# Patient Record
Sex: FEMALE | Race: WHITE | NOT HISPANIC OR LATINO | Employment: OTHER | URBAN - METROPOLITAN AREA
[De-identification: names, ages, dates, MRNs, and addresses within clinical notes are randomized per-mention and may not be internally consistent; named-entity substitution may affect disease eponyms.]

---

## 2021-06-10 ENCOUNTER — HOSPITAL ENCOUNTER (INPATIENT)
Facility: CLINIC | Age: 76
Setting detail: SURGERY ADMIT
End: 2021-06-10
Attending: COLON & RECTAL SURGERY | Admitting: COLON & RECTAL SURGERY
Payer: MEDICARE

## 2021-06-10 DIAGNOSIS — Z11.59 ENCOUNTER FOR SCREENING FOR OTHER VIRAL DISEASES: ICD-10-CM

## 2021-06-29 ENCOUNTER — HOSPITAL ENCOUNTER (OUTPATIENT)
Dept: CT IMAGING | Facility: CLINIC | Age: 76
Discharge: HOME | End: 2021-06-29
Attending: COLON & RECTAL SURGERY | Admitting: COLON & RECTAL SURGERY
Payer: MEDICARE

## 2021-06-29 DIAGNOSIS — K63.5 CECAL POLYP: ICD-10-CM

## 2021-06-29 LAB
CREAT BLD-MCNC: 0.8 MG/DL (ref 0.52–1.04)
GFR SERPL CREATININE-BSD FRML MDRD: 70 ML/MIN/{1.73_M2}

## 2021-06-29 PROCEDURE — 82565 ASSAY OF CREATININE: CPT

## 2021-06-29 PROCEDURE — 250N000009 HC RX 250: Performed by: COLON & RECTAL SURGERY

## 2021-06-29 PROCEDURE — 74177 CT ABD & PELVIS W/CONTRAST: CPT

## 2021-06-29 PROCEDURE — 250N000011 HC RX IP 250 OP 636: Performed by: COLON & RECTAL SURGERY

## 2021-06-29 RX ORDER — IOPAMIDOL 755 MG/ML
68 INJECTION, SOLUTION INTRAVASCULAR ONCE
Status: COMPLETED | OUTPATIENT
Start: 2021-06-29 | End: 2021-06-29

## 2021-06-29 RX ADMIN — SODIUM CHLORIDE 59 ML: 9 INJECTION, SOLUTION INTRAVENOUS at 14:00

## 2021-06-29 RX ADMIN — IOPAMIDOL 68 ML: 755 INJECTION, SOLUTION INTRAVENOUS at 14:00

## 2021-07-10 DIAGNOSIS — Z11.59 ENCOUNTER FOR SCREENING FOR OTHER VIRAL DISEASES: ICD-10-CM

## 2021-07-10 LAB
SARS-COV-2 RNA RESP QL NAA+PROBE: NORMAL
SPECIMEN SOURCE: NORMAL

## 2021-07-10 PROCEDURE — U0005 INFEC AGEN DETEC AMPLI PROBE: HCPCS | Performed by: COLON & RECTAL SURGERY

## 2021-07-10 PROCEDURE — U0003 INFECTIOUS AGENT DETECTION BY NUCLEIC ACID (DNA OR RNA); SEVERE ACUTE RESPIRATORY SYNDROME CORONAVIRUS 2 (SARS-COV-2) (CORONAVIRUS DISEASE [COVID-19]), AMPLIFIED PROBE TECHNIQUE, MAKING USE OF HIGH THROUGHPUT TECHNOLOGIES AS DESCRIBED BY CMS-2020-01-R: HCPCS | Performed by: COLON & RECTAL SURGERY

## 2021-07-11 LAB
LABORATORY COMMENT REPORT: NORMAL
SARS-COV-2 RNA RESP QL NAA+PROBE: NEGATIVE
SPECIMEN SOURCE: NORMAL

## 2021-07-13 RX ORDER — LATANOPROST 50 UG/ML
1 SOLUTION/ DROPS OPHTHALMIC AT BEDTIME
COMMUNITY

## 2021-07-13 RX ORDER — DORZOLAMIDE HCL 20 MG/ML
1 SOLUTION/ DROPS OPHTHALMIC 2 TIMES DAILY
COMMUNITY
Start: 2021-07-08

## 2021-07-13 RX ORDER — ALBUTEROL SULFATE 90 UG/1
1-2 AEROSOL, METERED RESPIRATORY (INHALATION) EVERY 6 HOURS PRN
COMMUNITY
Start: 2021-07-08

## 2021-07-13 RX ORDER — NEOMYCIN SULFATE 500 MG/1
500 TABLET ORAL SEE ADMIN INSTRUCTIONS
Status: ON HOLD | COMMUNITY
Start: 2021-06-12 | End: 2022-01-14

## 2021-07-13 RX ORDER — EPINEPHRINE 0.3 MG/.3ML
0.3 INJECTION SUBCUTANEOUS
COMMUNITY
Start: 2021-07-08 | End: 2022-01-11

## 2021-07-13 RX ORDER — ALLOPURINOL 300 MG/1
300 TABLET ORAL EVERY EVENING
COMMUNITY
Start: 2019-09-13

## 2021-07-13 RX ORDER — METRONIDAZOLE 500 MG/1
500 TABLET ORAL SEE ADMIN INSTRUCTIONS
Status: ON HOLD | COMMUNITY
Start: 2021-06-12 | End: 2022-01-14

## 2021-07-13 RX ORDER — ONDANSETRON 4 MG/1
4 TABLET, ORALLY DISINTEGRATING ORAL EVERY 6 HOURS PRN
COMMUNITY
Start: 2021-06-12

## 2021-12-16 DIAGNOSIS — Z11.59 ENCOUNTER FOR SCREENING FOR OTHER VIRAL DISEASES: ICD-10-CM

## 2022-01-11 RX ORDER — ASPIRIN 81 MG/1
81 TABLET, CHEWABLE ORAL DAILY
COMMUNITY

## 2022-01-11 RX ORDER — LETROZOLE 2.5 MG/1
2.5 TABLET, FILM COATED ORAL EVERY EVENING
COMMUNITY

## 2022-01-11 RX ORDER — ACETAMINOPHEN 500 MG
500-1000 TABLET ORAL EVERY 6 HOURS PRN
COMMUNITY

## 2022-01-11 RX ORDER — CLOPIDOGREL BISULFATE 75 MG/1
75 TABLET ORAL DAILY
Status: ON HOLD | COMMUNITY
End: 2022-01-12

## 2022-01-12 ENCOUNTER — ANESTHESIA (OUTPATIENT)
Dept: SURGERY | Facility: CLINIC | Age: 77
DRG: 330 | End: 2022-01-12
Payer: MEDICARE

## 2022-01-12 ENCOUNTER — HOSPITAL ENCOUNTER (INPATIENT)
Facility: CLINIC | Age: 77
LOS: 3 days | Discharge: HOME | DRG: 330 | End: 2022-01-15
Attending: COLON & RECTAL SURGERY | Admitting: COLON & RECTAL SURGERY
Payer: MEDICARE

## 2022-01-12 ENCOUNTER — ANESTHESIA EVENT (OUTPATIENT)
Dept: SURGERY | Facility: CLINIC | Age: 77
DRG: 330 | End: 2022-01-12
Payer: MEDICARE

## 2022-01-12 DIAGNOSIS — K63.5 CECAL POLYP: Primary | ICD-10-CM

## 2022-01-12 PROCEDURE — 0DTF0ZZ RESECTION OF RIGHT LARGE INTESTINE, OPEN APPROACH: ICD-10-PCS | Performed by: COLON & RECTAL SURGERY

## 2022-01-12 PROCEDURE — 250N000011 HC RX IP 250 OP 636: Performed by: ANESTHESIOLOGY

## 2022-01-12 PROCEDURE — 999N000141 HC STATISTIC PRE-PROCEDURE NURSING ASSESSMENT: Performed by: COLON & RECTAL SURGERY

## 2022-01-12 PROCEDURE — 258N000003 HC RX IP 258 OP 636: Performed by: NURSE ANESTHETIST, CERTIFIED REGISTERED

## 2022-01-12 PROCEDURE — 250N000025 HC SEVOFLURANE, PER MIN: Performed by: COLON & RECTAL SURGERY

## 2022-01-12 PROCEDURE — 250N000011 HC RX IP 250 OP 636: Performed by: COLON & RECTAL SURGERY

## 2022-01-12 PROCEDURE — 250N000011 HC RX IP 250 OP 636: Performed by: NURSE ANESTHETIST, CERTIFIED REGISTERED

## 2022-01-12 PROCEDURE — 250N000009 HC RX 250: Performed by: NURSE ANESTHETIST, CERTIFIED REGISTERED

## 2022-01-12 PROCEDURE — 88309 TISSUE EXAM BY PATHOLOGIST: CPT | Mod: TC | Performed by: COLON & RECTAL SURGERY

## 2022-01-12 PROCEDURE — 258N000001 HC RX 258: Performed by: COLON & RECTAL SURGERY

## 2022-01-12 PROCEDURE — 250N000009 HC RX 250: Performed by: COLON & RECTAL SURGERY

## 2022-01-12 PROCEDURE — P9041 ALBUMIN (HUMAN),5%, 50ML: HCPCS | Performed by: NURSE ANESTHETIST, CERTIFIED REGISTERED

## 2022-01-12 PROCEDURE — 370N000017 HC ANESTHESIA TECHNICAL FEE, PER MIN: Performed by: COLON & RECTAL SURGERY

## 2022-01-12 PROCEDURE — 258N000003 HC RX IP 258 OP 636: Performed by: ANESTHESIOLOGY

## 2022-01-12 PROCEDURE — 710N000009 HC RECOVERY PHASE 1, LEVEL 1, PER MIN: Performed by: COLON & RECTAL SURGERY

## 2022-01-12 PROCEDURE — 250N000013 HC RX MED GY IP 250 OP 250 PS 637: Performed by: COLON & RECTAL SURGERY

## 2022-01-12 PROCEDURE — 360N000077 HC SURGERY LEVEL 4, PER MIN: Performed by: COLON & RECTAL SURGERY

## 2022-01-12 PROCEDURE — 0DJD8ZZ INSPECTION OF LOWER INTESTINAL TRACT, VIA NATURAL OR ARTIFICIAL OPENING ENDOSCOPIC: ICD-10-PCS | Performed by: COLON & RECTAL SURGERY

## 2022-01-12 PROCEDURE — 99221 1ST HOSP IP/OBS SF/LOW 40: CPT | Performed by: PHYSICIAN ASSISTANT

## 2022-01-12 PROCEDURE — 120N000001 HC R&B MED SURG/OB

## 2022-01-12 PROCEDURE — 272N000001 HC OR GENERAL SUPPLY STERILE: Performed by: COLON & RECTAL SURGERY

## 2022-01-12 PROCEDURE — 258N000003 HC RX IP 258 OP 636: Performed by: COLON & RECTAL SURGERY

## 2022-01-12 RX ORDER — LETROZOLE 2.5 MG/1
2.5 TABLET, FILM COATED ORAL EVERY EVENING
Status: DISCONTINUED | OUTPATIENT
Start: 2022-01-13 | End: 2022-01-15 | Stop reason: HOSPADM

## 2022-01-12 RX ORDER — ONDANSETRON 4 MG/1
4 TABLET, ORALLY DISINTEGRATING ORAL EVERY 30 MIN PRN
Status: DISCONTINUED | OUTPATIENT
Start: 2022-01-12 | End: 2022-01-12 | Stop reason: HOSPADM

## 2022-01-12 RX ORDER — NALOXONE HYDROCHLORIDE 0.4 MG/ML
0.2 INJECTION, SOLUTION INTRAMUSCULAR; INTRAVENOUS; SUBCUTANEOUS
Status: DISCONTINUED | OUTPATIENT
Start: 2022-01-12 | End: 2022-01-15 | Stop reason: HOSPADM

## 2022-01-12 RX ORDER — OXYCODONE HYDROCHLORIDE 5 MG/1
5 TABLET ORAL EVERY 4 HOURS PRN
Status: DISCONTINUED | OUTPATIENT
Start: 2022-01-12 | End: 2022-01-15 | Stop reason: HOSPADM

## 2022-01-12 RX ORDER — ACETAMINOPHEN 325 MG/1
975 TABLET ORAL EVERY 8 HOURS
Status: COMPLETED | OUTPATIENT
Start: 2022-01-12 | End: 2022-01-15

## 2022-01-12 RX ORDER — LIDOCAINE HYDROCHLORIDE 20 MG/ML
INJECTION, SOLUTION INFILTRATION; PERINEURAL PRN
Status: DISCONTINUED | OUTPATIENT
Start: 2022-01-12 | End: 2022-01-12

## 2022-01-12 RX ORDER — GLYCOPYRROLATE 0.2 MG/ML
INJECTION, SOLUTION INTRAMUSCULAR; INTRAVENOUS PRN
Status: DISCONTINUED | OUTPATIENT
Start: 2022-01-12 | End: 2022-01-12

## 2022-01-12 RX ORDER — ONDANSETRON 2 MG/ML
4 INJECTION INTRAMUSCULAR; INTRAVENOUS EVERY 6 HOURS PRN
Status: DISCONTINUED | OUTPATIENT
Start: 2022-01-12 | End: 2022-01-15 | Stop reason: HOSPADM

## 2022-01-12 RX ORDER — FENTANYL CITRATE 0.05 MG/ML
50 INJECTION, SOLUTION INTRAMUSCULAR; INTRAVENOUS EVERY 5 MIN PRN
Status: DISCONTINUED | OUTPATIENT
Start: 2022-01-12 | End: 2022-01-12 | Stop reason: HOSPADM

## 2022-01-12 RX ORDER — CEFAZOLIN SODIUM 2 G/100ML
2 INJECTION, SOLUTION INTRAVENOUS
Status: COMPLETED | OUTPATIENT
Start: 2022-01-12 | End: 2022-01-12

## 2022-01-12 RX ORDER — BUPIVACAINE HYDROCHLORIDE 5 MG/ML
INJECTION, SOLUTION PERINEURAL PRN
Status: DISCONTINUED | OUTPATIENT
Start: 2022-01-12 | End: 2022-01-12 | Stop reason: HOSPADM

## 2022-01-12 RX ORDER — FENTANYL CITRATE 50 UG/ML
INJECTION, SOLUTION INTRAMUSCULAR; INTRAVENOUS PRN
Status: DISCONTINUED | OUTPATIENT
Start: 2022-01-12 | End: 2022-01-12

## 2022-01-12 RX ORDER — ALBUTEROL SULFATE 90 UG/1
1-2 AEROSOL, METERED RESPIRATORY (INHALATION) EVERY 6 HOURS PRN
Status: DISCONTINUED | OUTPATIENT
Start: 2022-01-12 | End: 2022-01-15 | Stop reason: HOSPADM

## 2022-01-12 RX ORDER — DIPHENHYDRAMINE HYDROCHLORIDE 50 MG/ML
12.5 INJECTION INTRAMUSCULAR; INTRAVENOUS EVERY 6 HOURS PRN
Status: DISCONTINUED | OUTPATIENT
Start: 2022-01-12 | End: 2022-01-15 | Stop reason: HOSPADM

## 2022-01-12 RX ORDER — ONDANSETRON 2 MG/ML
INJECTION INTRAMUSCULAR; INTRAVENOUS PRN
Status: DISCONTINUED | OUTPATIENT
Start: 2022-01-12 | End: 2022-01-12

## 2022-01-12 RX ORDER — ONDANSETRON 2 MG/ML
4 INJECTION INTRAMUSCULAR; INTRAVENOUS EVERY 30 MIN PRN
Status: DISCONTINUED | OUTPATIENT
Start: 2022-01-12 | End: 2022-01-12 | Stop reason: HOSPADM

## 2022-01-12 RX ORDER — ACETAMINOPHEN 325 MG/1
975 TABLET ORAL ONCE
Status: COMPLETED | OUTPATIENT
Start: 2022-01-12 | End: 2022-01-12

## 2022-01-12 RX ORDER — ACETAMINOPHEN 325 MG/1
650 TABLET ORAL EVERY 4 HOURS PRN
Status: DISCONTINUED | OUTPATIENT
Start: 2022-01-15 | End: 2022-01-15 | Stop reason: HOSPADM

## 2022-01-12 RX ORDER — SODIUM CHLORIDE, SODIUM LACTATE, POTASSIUM CHLORIDE, CALCIUM CHLORIDE 600; 310; 30; 20 MG/100ML; MG/100ML; MG/100ML; MG/100ML
INJECTION, SOLUTION INTRAVENOUS CONTINUOUS
Status: DISCONTINUED | OUTPATIENT
Start: 2022-01-12 | End: 2022-01-14

## 2022-01-12 RX ORDER — PROPOFOL 10 MG/ML
INJECTION, EMULSION INTRAVENOUS CONTINUOUS PRN
Status: DISCONTINUED | OUTPATIENT
Start: 2022-01-12 | End: 2022-01-12

## 2022-01-12 RX ORDER — ALBUMIN, HUMAN INJ 5% 5 %
SOLUTION INTRAVENOUS CONTINUOUS PRN
Status: DISCONTINUED | OUTPATIENT
Start: 2022-01-12 | End: 2022-01-12

## 2022-01-12 RX ORDER — HEPARIN SODIUM 5000 [USP'U]/.5ML
5000 INJECTION, SOLUTION INTRAVENOUS; SUBCUTANEOUS
Status: COMPLETED | OUTPATIENT
Start: 2022-01-12 | End: 2022-01-12

## 2022-01-12 RX ORDER — DEXAMETHASONE SODIUM PHOSPHATE 4 MG/ML
INJECTION, SOLUTION INTRA-ARTICULAR; INTRALESIONAL; INTRAMUSCULAR; INTRAVENOUS; SOFT TISSUE PRN
Status: DISCONTINUED | OUTPATIENT
Start: 2022-01-12 | End: 2022-01-12

## 2022-01-12 RX ORDER — LIDOCAINE 40 MG/G
CREAM TOPICAL
Status: DISCONTINUED | OUTPATIENT
Start: 2022-01-12 | End: 2022-01-15 | Stop reason: HOSPADM

## 2022-01-12 RX ORDER — DORZOLAMIDE HCL 20 MG/ML
1 SOLUTION/ DROPS OPHTHALMIC 2 TIMES DAILY
Status: DISCONTINUED | OUTPATIENT
Start: 2022-01-12 | End: 2022-01-15 | Stop reason: HOSPADM

## 2022-01-12 RX ORDER — SODIUM CHLORIDE, SODIUM LACTATE, POTASSIUM CHLORIDE, CALCIUM CHLORIDE 600; 310; 30; 20 MG/100ML; MG/100ML; MG/100ML; MG/100ML
INJECTION, SOLUTION INTRAVENOUS CONTINUOUS
Status: DISCONTINUED | OUTPATIENT
Start: 2022-01-12 | End: 2022-01-15 | Stop reason: HOSPADM

## 2022-01-12 RX ORDER — CEFAZOLIN SODIUM 1 G/3ML
1 INJECTION, POWDER, FOR SOLUTION INTRAMUSCULAR; INTRAVENOUS EVERY 8 HOURS
Status: COMPLETED | OUTPATIENT
Start: 2022-01-12 | End: 2022-01-13

## 2022-01-12 RX ORDER — LATANOPROST 50 UG/ML
1 SOLUTION/ DROPS OPHTHALMIC AT BEDTIME
Status: DISCONTINUED | OUTPATIENT
Start: 2022-01-12 | End: 2022-01-15 | Stop reason: HOSPADM

## 2022-01-12 RX ORDER — NALOXONE HYDROCHLORIDE 0.4 MG/ML
0.4 INJECTION, SOLUTION INTRAMUSCULAR; INTRAVENOUS; SUBCUTANEOUS
Status: DISCONTINUED | OUTPATIENT
Start: 2022-01-12 | End: 2022-01-15 | Stop reason: HOSPADM

## 2022-01-12 RX ORDER — ONDANSETRON 4 MG/1
4 TABLET, ORALLY DISINTEGRATING ORAL EVERY 6 HOURS PRN
Status: DISCONTINUED | OUTPATIENT
Start: 2022-01-12 | End: 2022-01-15 | Stop reason: HOSPADM

## 2022-01-12 RX ORDER — CEFAZOLIN SODIUM 2 G/100ML
2 INJECTION, SOLUTION INTRAVENOUS SEE ADMIN INSTRUCTIONS
Status: DISCONTINUED | OUTPATIENT
Start: 2022-01-12 | End: 2022-01-12

## 2022-01-12 RX ORDER — EPHEDRINE SULFATE 50 MG/ML
INJECTION, SOLUTION INTRAMUSCULAR; INTRAVENOUS; SUBCUTANEOUS PRN
Status: DISCONTINUED | OUTPATIENT
Start: 2022-01-12 | End: 2022-01-12

## 2022-01-12 RX ORDER — SODIUM CHLORIDE, SODIUM LACTATE, POTASSIUM CHLORIDE, CALCIUM CHLORIDE 600; 310; 30; 20 MG/100ML; MG/100ML; MG/100ML; MG/100ML
INJECTION, SOLUTION INTRAVENOUS CONTINUOUS
Status: DISCONTINUED | OUTPATIENT
Start: 2022-01-12 | End: 2022-01-12 | Stop reason: HOSPADM

## 2022-01-12 RX ORDER — MAGNESIUM HYDROXIDE 1200 MG/15ML
LIQUID ORAL PRN
Status: DISCONTINUED | OUTPATIENT
Start: 2022-01-12 | End: 2022-01-12 | Stop reason: HOSPADM

## 2022-01-12 RX ORDER — HYDROMORPHONE HCL IN WATER/PF 6 MG/30 ML
0.4 PATIENT CONTROLLED ANALGESIA SYRINGE INTRAVENOUS EVERY 5 MIN PRN
Status: DISCONTINUED | OUTPATIENT
Start: 2022-01-12 | End: 2022-01-12 | Stop reason: HOSPADM

## 2022-01-12 RX ORDER — PROPOFOL 10 MG/ML
INJECTION, EMULSION INTRAVENOUS PRN
Status: DISCONTINUED | OUTPATIENT
Start: 2022-01-12 | End: 2022-01-12

## 2022-01-12 RX ORDER — NEOSTIGMINE METHYLSULFATE 1 MG/ML
VIAL (ML) INJECTION PRN
Status: DISCONTINUED | OUTPATIENT
Start: 2022-01-12 | End: 2022-01-12

## 2022-01-12 RX ORDER — OXYCODONE HYDROCHLORIDE 5 MG/1
10 TABLET ORAL EVERY 4 HOURS PRN
Status: DISCONTINUED | OUTPATIENT
Start: 2022-01-12 | End: 2022-01-15 | Stop reason: HOSPADM

## 2022-01-12 RX ORDER — ALLOPURINOL 300 MG/1
300 TABLET ORAL EVERY EVENING
Status: DISCONTINUED | OUTPATIENT
Start: 2022-01-12 | End: 2022-01-15 | Stop reason: HOSPADM

## 2022-01-12 RX ORDER — HYDROMORPHONE HCL IN WATER/PF 6 MG/30 ML
0.4 PATIENT CONTROLLED ANALGESIA SYRINGE INTRAVENOUS
Status: DISCONTINUED | OUTPATIENT
Start: 2022-01-12 | End: 2022-01-15 | Stop reason: HOSPADM

## 2022-01-12 RX ORDER — ONDANSETRON 2 MG/ML
4 INJECTION INTRAMUSCULAR; INTRAVENOUS ONCE
Status: DISCONTINUED | OUTPATIENT
Start: 2022-01-12 | End: 2022-01-12

## 2022-01-12 RX ORDER — HYDROMORPHONE HCL IN WATER/PF 6 MG/30 ML
0.2 PATIENT CONTROLLED ANALGESIA SYRINGE INTRAVENOUS
Status: DISCONTINUED | OUTPATIENT
Start: 2022-01-12 | End: 2022-01-15 | Stop reason: HOSPADM

## 2022-01-12 RX ADMIN — FENTANYL CITRATE 100 MCG: 50 INJECTION, SOLUTION INTRAMUSCULAR; INTRAVENOUS at 12:27

## 2022-01-12 RX ADMIN — HYDROMORPHONE HYDROCHLORIDE 0.5 MG: 1 INJECTION, SOLUTION INTRAMUSCULAR; INTRAVENOUS; SUBCUTANEOUS at 13:29

## 2022-01-12 RX ADMIN — ROCURONIUM BROMIDE 20 MG: 50 INJECTION, SOLUTION INTRAVENOUS at 14:08

## 2022-01-12 RX ADMIN — PHENYLEPHRINE HYDROCHLORIDE 0.3 MCG/KG/MIN: 10 INJECTION INTRAVENOUS at 12:50

## 2022-01-12 RX ADMIN — HEPARIN SODIUM 5000 UNITS: 5000 INJECTION INTRAVENOUS; SUBCUTANEOUS at 13:16

## 2022-01-12 RX ADMIN — DORZOLAMIDE HCL 1 DROP: 20 SOLUTION/ DROPS OPHTHALMIC at 20:30

## 2022-01-12 RX ADMIN — PHENYLEPHRINE HYDROCHLORIDE 100 MCG: 10 INJECTION INTRAVENOUS at 12:50

## 2022-01-12 RX ADMIN — PROPOFOL 120 MG: 10 INJECTION, EMULSION INTRAVENOUS at 12:27

## 2022-01-12 RX ADMIN — PHENYLEPHRINE HYDROCHLORIDE 100 MCG: 10 INJECTION INTRAVENOUS at 12:59

## 2022-01-12 RX ADMIN — ROCURONIUM BROMIDE 30 MG: 50 INJECTION, SOLUTION INTRAVENOUS at 13:26

## 2022-01-12 RX ADMIN — CEFAZOLIN SODIUM 2 G: 2 INJECTION, SOLUTION INTRAVENOUS at 13:11

## 2022-01-12 RX ADMIN — ALBUMIN HUMAN: 0.05 INJECTION, SOLUTION INTRAVENOUS at 12:59

## 2022-01-12 RX ADMIN — LATANOPROST 1 DROP: 50 SOLUTION/ DROPS OPHTHALMIC at 21:17

## 2022-01-12 RX ADMIN — NEOSTIGMINE METHYLSULFATE 3 MG: 1 INJECTION, SOLUTION INTRAVENOUS at 15:15

## 2022-01-12 RX ADMIN — HYDROMORPHONE HYDROCHLORIDE 0.4 MG: 0.2 INJECTION, SOLUTION INTRAMUSCULAR; INTRAVENOUS; SUBCUTANEOUS at 20:23

## 2022-01-12 RX ADMIN — Medication 5 MG: at 12:59

## 2022-01-12 RX ADMIN — HYDROMORPHONE HYDROCHLORIDE 0.5 MG: 1 INJECTION, SOLUTION INTRAMUSCULAR; INTRAVENOUS; SUBCUTANEOUS at 15:08

## 2022-01-12 RX ADMIN — DEXAMETHASONE SODIUM PHOSPHATE 4 MG: 4 INJECTION, SOLUTION INTRA-ARTICULAR; INTRALESIONAL; INTRAMUSCULAR; INTRAVENOUS; SOFT TISSUE at 12:40

## 2022-01-12 RX ADMIN — GLYCOPYRROLATE 0.4 MG: 0.2 INJECTION, SOLUTION INTRAMUSCULAR; INTRAVENOUS at 15:15

## 2022-01-12 RX ADMIN — LIDOCAINE HYDROCHLORIDE 100 MG: 20 INJECTION, SOLUTION INFILTRATION; PERINEURAL at 12:27

## 2022-01-12 RX ADMIN — METRONIDAZOLE 500 MG: 500 INJECTION, SOLUTION INTRAVENOUS at 20:23

## 2022-01-12 RX ADMIN — CEFAZOLIN 1 G: 1 INJECTION, POWDER, FOR SOLUTION INTRAMUSCULAR; INTRAVENOUS at 21:45

## 2022-01-12 RX ADMIN — ONDANSETRON 4 MG: 2 INJECTION INTRAMUSCULAR; INTRAVENOUS at 14:51

## 2022-01-12 RX ADMIN — HYDROMORPHONE HYDROCHLORIDE 0.4 MG: 0.2 INJECTION, SOLUTION INTRAMUSCULAR; INTRAVENOUS; SUBCUTANEOUS at 15:59

## 2022-01-12 RX ADMIN — HYDROMORPHONE HYDROCHLORIDE 0.4 MG: 0.2 INJECTION, SOLUTION INTRAMUSCULAR; INTRAVENOUS; SUBCUTANEOUS at 16:12

## 2022-01-12 RX ADMIN — Medication 5 MG: at 12:50

## 2022-01-12 RX ADMIN — PROPOFOL 30 MCG/KG/MIN: 10 INJECTION, EMULSION INTRAVENOUS at 12:27

## 2022-01-12 RX ADMIN — ALLOPURINOL 300 MG: 300 TABLET ORAL at 20:23

## 2022-01-12 RX ADMIN — SODIUM CHLORIDE, POTASSIUM CHLORIDE, SODIUM LACTATE AND CALCIUM CHLORIDE: 600; 310; 30; 20 INJECTION, SOLUTION INTRAVENOUS at 10:13

## 2022-01-12 RX ADMIN — ACETAMINOPHEN 975 MG: 325 TABLET, FILM COATED ORAL at 21:40

## 2022-01-12 RX ADMIN — ROCURONIUM BROMIDE 50 MG: 50 INJECTION, SOLUTION INTRAVENOUS at 12:27

## 2022-01-12 RX ADMIN — METRONIDAZOLE 500 MG: 500 INJECTION, SOLUTION INTRAVENOUS at 10:14

## 2022-01-12 RX ADMIN — ACETAMINOPHEN 975 MG: 325 TABLET, FILM COATED ORAL at 10:14

## 2022-01-12 RX ADMIN — SODIUM CHLORIDE, POTASSIUM CHLORIDE, SODIUM LACTATE AND CALCIUM CHLORIDE: 600; 310; 30; 20 INJECTION, SOLUTION INTRAVENOUS at 15:15

## 2022-01-12 RX ADMIN — HYDROMORPHONE HYDROCHLORIDE 0.4 MG: 0.2 INJECTION, SOLUTION INTRAMUSCULAR; INTRAVENOUS; SUBCUTANEOUS at 17:52

## 2022-01-12 ASSESSMENT — ENCOUNTER SYMPTOMS
SEIZURES: 0
DYSRHYTHMIAS: 0

## 2022-01-12 ASSESSMENT — ACTIVITIES OF DAILY LIVING (ADL)
ADLS_ACUITY_SCORE: 4
ADLS_ACUITY_SCORE: 10

## 2022-01-12 ASSESSMENT — MIFFLIN-ST. JEOR: SCORE: 984.84

## 2022-01-12 ASSESSMENT — LIFESTYLE VARIABLES: TOBACCO_USE: 0

## 2022-01-12 NOTE — ANESTHESIA PREPROCEDURE EVALUATION
Anesthesia Pre-Procedure Evaluation    Patient: Armaan Bucio   MRN: [id number] : 1945        Preoperative Diagnosis: Colon polyp [K63.5]    Procedure : Procedure(s):  INTRA-OPERATIVE COLONOSCOPY  POLYPECTOMY, POSSIBLE LAPAROSCOPIC COLECTOMY, POSSIBLE OPEN RIGHT COLECTOMY          Past Medical History:   Diagnosis Date     Aortic stenosis      Arthritis     Osteoarthritis     Breast cancer (H)      Breast mass in female      Gout      Hypertension      Normocytic anemia      Obesity      Uncomplicated asthma       Past Surgical History:   Procedure Laterality Date     BACK SURGERY      Lumbar Disc Surgery     CARDIAC SURGERY      TAVR 2021     CHOLECYSTECTOMY       GYN SURGERY      FELICITA     ORTHOPEDIC SURGERY Left     Knee replacement      ORTHOPEDIC SURGERY Right     Knee Replacement     ORTHOPEDIC SURGERY Right     Carpal Tunnel Release      Allergies   Allergen Reactions     Bee Venom Anaphylaxis      Social History     Tobacco Use     Smoking status: Never Smoker     Smokeless tobacco: Never Used   Substance Use Topics     Alcohol use: Yes     Comment: one drink daily      Wt Readings from Last 1 Encounters:   No data found for Wt        Anesthesia Evaluation   Pt has had prior anesthetic. Type: General.    No history of anesthetic complications       ROS/MED HX  ENT/Pulmonary:     (+) asthma  (-) tobacco use and sleep apnea   Neurologic:     (+) no peripheral neuropathy  (-) no seizures and no CVA   Cardiovascular: Comment: S/p TAVR 2021    (+) hypertension-----Taking blood thinners  (-) CAD and arrhythmias   METS/Exercise Tolerance:     Hematologic:       Musculoskeletal:   (+) arthritis,     GI/Hepatic:    (-) GERD   Renal/Genitourinary:    (-) renal disease   Endo:     (+) Obesity,  (-) Type II DM and thyroid disease   Psychiatric/Substance Use:       Infectious Disease:    (-) Recent Fever   Malignancy:       Other:            Physical Exam    Airway  airway exam normal      Mallampati: II    TM distance: > 3 FB   Neck ROM: full   Mouth opening: > 3 cm    Respiratory Devices and Support         Dental  no notable dental history         Cardiovascular   cardiovascular exam normal          Pulmonary   pulmonary exam normal                     ECHOCARDIOGRAM.   Date: 11/11/2021 Start: 12:58 PM Facility: [facility]   Normal biventricular function.   s/p 26mm Edward Deborah Ultra valve. Peak velocity 2.7, Mean gradient   16mmHg. Trace perivalvular leak. Valve is well seated. Compared to   prior study 9/2/21, trace perivalvular AI is new and the mean gradient   & Vmax have increased slightly from 13mmHg and 2.2 m/s respectively.   No other hemodynamically significant valvular disease.   No other significant changes compared to prior study from 9/2/21.       FINDINGS     MITRAL VALVE   Mild mitral annular calcification.     AORTIC VALVE   s/p 26mm Edward Deborah Ultra valve. Peak velocity 2.7, Mean gradient   16mmHg.   Trace perivalvular leak.   Valve is well seated.     TRICUSPID VALVE   Normal tricuspid valve structure and function.   Pulmonary artery systolic pressure estimate is 20mmHg above   RAP(Normal) .     PULMONIC VALVE   The pulmonic valve is not well visualized, but no pulmonic   regurgitation is noted.     LEFT ATRIUM   Normal left atrium.   Left atrial volume index is 22 mL/m^2. (Normal <34mL/m^2).     LEFT VENTRICLE   Left ventricular chamber size is normal.   Moderate (1.4-1.6 cm) concentric wall thickening consistent with left   ventricular hypertrophy is present.   Left ventricular ejection fraction is visually estimated at 65%.   Indeterminate diastolic function.     RIGHT ATRIUM   Normal right atrium.     RIGHT VENTRICLE   Normal right ventricle size and normal global function.     PERICARDIAL EFFUSION   There is no pericardial effusion.       MISCELLANEOUS   Mild dilation of the aorta is present involving the ascending aorta.   (Maximal dimension 4.1 cm).   IVC not  well visualized.     M-MODE/2D MEASUREMENTS & CALCULATIONS    LV Diastolic Dimension: 3.6 cm    LV PW Diastolic: 1.4 cm    Septum Diastolic: 1.5 cm    CO: 5.06 l/min                              LA Dimension: 3.1 cm    CI: 3.12 l/m*m^2                            LA Area: 13.1 cm^2    LV Systolic Dimension: 2.7 cm    LV Volume Diastolic: 46.7 ml                LA volume index: 21.5    LV Volume Systolic: 19.7 ml                 ml/m^2    LV EDV/LV EDV Index: 46.7 ml/29 m^2    LV ESV/LV ESV Index: 19.7 ml/12 m^2EF    Estimated: 65 %    LVOT: 2 cm     DOPPLER MEASUREMENTS & CALCULATIONS    MV Peak E-Wave: 0.7 m/s    MV Peak A-Wave: 1.2 m/s    MV E/A Ratio: 0.58    MV Peak Gradient: 1.9     LVOT Peak Velocity: 0.9 m/sLVOT Mean    mmHg                      Velocity: 0.5 m/s                              LVOT Peak Gradient: 3 mmHg    MV Deceleration Time: 211 LVOT Mean Gradient: 1 mmHg    msec    E' Velocity: 0.07 m/s                              TR Velocity:2.2 m/s                              TR Gradient:19.9 mmHg                              PV Peak Velocity: 1.1 m/s                              PV Peak Gradient: 4.75 mmHg    AV Peak Velocity: 2.7 m/s    AV Peak Gradient: 29.16    mmHg    AV Mean Velocity: 1.9 m/s    AV Mean Gradient: 16 mmHg    AV VTI: 54.7 cm    AV Area (Continuity):1.03    cm^2    LVOT VTI: 17.9 cm     PROCEDURE     Doppler Quality: Adequate quality pulse, continuous wave, and color   Doppler was performed and interpreted.   2-D Quality: Adequate quality 2-dimensional echo was performed and   interpreted.     Indications: S/P TAVR.     Contrast Medium: Not Applicable.   Height: 60 inches Weight: 143 pounds BSA: 1.62 m^2 BMI: 27.93 kg/m^2   Rhythm: Sinus HR: 90 bpm BP: 136/88 mmHg      Gender:                            Female   SIGNATURE   ----------------------------------------------------------------------    Electronically signed by EVANS CORRAL MD(Interpreting Physician)    on 11/11/2021 03:20  PM               OUTSIDE LABS:  CBC: No results found for: WBC, HGB, HCT, PLT  BMP: No results found for: NA, POTASSIUM, CHLORIDE, CO2, BUN, CR, GLC  COAGS: No results found for: PTT, INR, FIBR  POC: No results found for: BGM, HCG, HCGS  HEPATIC: No results found for: ALBUMIN, PROTTOTAL, ALT, AST, GGT, ALKPHOS, BILITOTAL, BILIDIRECT, LARISA  OTHER: No results found for: PH, LACT, A1C, VANESSA, PHOS, MAG, LIPASE, AMYLASE, TSH, T4, T3, CRP, SED    Anesthesia Plan    ASA Status:  2   NPO Status:  NPO Appropriate    Anesthesia Type: General.     - Airway: ETT   Induction: Intravenous.   Maintenance: Balanced.   Techniques and Equipment:     - Airway: Video-Laryngoscope         Consents    Anesthesia Plan(s) and associated risks, benefits, and realistic alternatives discussed. Questions answered and patient/representative(s) expressed understanding.    - Discussed:     - Discussed with:  Patient         Postoperative Care    Pain management: IV analgesics, Oral pain medications.   PONV prophylaxis: Ondansetron (or other 5HT-3), Dexamethasone or Solumedrol, Background Propofol Infusion     Comments:                Leobardo Gutierrez MD

## 2022-01-12 NOTE — ANESTHESIA CARE TRANSFER NOTE
Patient: Armaan Bucio    Procedure: Procedure(s):  INTRA-OPERATIVE COLONOSCOPY  LAPAROSCOPIC COLECTOMY       Diagnosis: Colon polyp [K63.5]  Diagnosis Additional Information: No value filed.    Anesthesia Type:   General     Note:    Oropharynx: oropharynx clear of all foreign objects and spontaneously breathing  Level of Consciousness: drowsy  Oxygen Supplementation: face mask  Level of Supplemental Oxygen (L/min / FiO2): 8  Independent Airway: airway patency satisfactory and stable  Dentition: dentition unchanged  Vital Signs Stable: post-procedure vital signs reviewed and stable  Report to RN Given: handoff report given  Patient transferred to: PACU    Handoff Report: Identifed the Patient, Identified the Reponsible Provider, Reviewed the pertinent medical history, Discussed the surgical course, Reviewed Intra-OP anesthesia mangement and issues during anesthesia, Set expectations for post-procedure period and Allowed opportunity for questions and acknowledgement of understanding      Vitals:  Vitals Value Taken Time   /91 01/12/22 1539   Temp 36.2  C (97.2  F) 01/12/22 1539   Pulse 75 01/12/22 1541   Resp 11 01/12/22 1541   SpO2 100 % 01/12/22 1541   Vitals shown include unvalidated device data.    Electronically Signed By: MELANIE Chaudhary CRNA  January 12, 2022  3:42 PM

## 2022-01-12 NOTE — PROGRESS NOTES
Dr. Plascencia approved pt to Tx to gen surg 2212.  A&Ox4, 2L NC--lungs clear, Tele: , mi patent w/ 75cc UOP in PACU, port sites c/d/i, Dilaudid 0.8mg total for pain control, CMS intact.  Son Barry updated.

## 2022-01-12 NOTE — PROGRESS NOTES
PTA medications updated by Medication Scribe prior to surgery via phone call with patient (last doses completed by Nurse)     Medication history sources: Patient, Surescripts and H&P  In the past week, patient estimated taking medication this percent of the time: Greater than 90%  Adherence assessment: N/A Not Observed    Significant changes made to the medication list:  Patient reports no longer taking the following meds (med scribe removed from PTA med list): Epinephrine Injection Pen      Additional medication history information:   Patient brought own home meds: Dorzolamide Eye Drops, Latanoprost Eye Drops    Medication reconciliation completed by provider prior to medication history? No    Time spent in this activity: 25 minutes    The information provided in this note is only as accurate as the sources available at the time of update(s)    Prior to Admission medications    Medication Sig Last Dose Taking? Auth Provider   acetaminophen (TYLENOL) 500 MG tablet Take 500-1,000 mg by mouth every 6 hours as needed for mild pain  at PRN Yes Reported, Patient   albuterol (PROAIR HFA/PROVENTIL HFA/VENTOLIN HFA) 108 (90 Base) MCG/ACT inhaler Inhale 1-2 puffs into the lungs every 6 hours as needed for shortness of breath / dyspnea   at PRN Yes Reported, Patient   allopurinol (ZYLOPRIM) 300 MG tablet Take 300 mg by mouth every evening  1/11/2022 at PM Yes Reported, Patient   aspirin (ASA) 81 MG chewable tablet Take 81 mg by mouth daily 1/11/2022 at AM Yes Reported, Patient   clopidogrel (PLAVIX) 75 MG tablet Take 75 mg by mouth daily 1/6/2022 at PM Yes Reported, Patient   dorzolamide (TRUSOPT) 2 % ophthalmic solution Place 1 drop into both eyes 2 times daily  1/11/2022 at PM Yes Reported, Patient   latanoprost (XALATAN) 0.005 % ophthalmic solution Place 1 drop into both eyes At Bedtime  1/11/2022 at PM Yes Reported, Patient   letrozole (FEMARA) 2.5 MG tablet Take 2.5 mg by mouth every evening 1/11/2022 at PM Yes Reported,  Patient   ondansetron (ZOFRAN-ODT) 4 MG ODT tab Take 4 mg by mouth every 6 hours as needed for nausea or vomiting   at PRN Yes Reported, Patient   metroNIDAZOLE (FLAGYL) 500 MG tablet Take 500 mg by mouth See Admin Instructions Three times day before surgery; at 1pm, 2pm, and 11pm 1/11/2022 at 2300  Reported, Patient   neomycin (MYCIFRADIN) 500 MG tablet Take 500 mg by mouth See Admin Instructions Three times day before surgery; at 1pm, 2pm, and 11pm 1/11/2022 at 2300  Reported, Patient     Medication history completed by:    Ez Guzman CPhT  Medication [facility]

## 2022-01-12 NOTE — BRIEF OP NOTE
[facility]    Brief Operative Note    Pre-operative diagnosis: Colon polyp [K63.5]  Post-operative diagnosis Same as pre-operative diagnosis    Procedure: Procedure(s):  INTRA-OPERATIVE COLONOSCOPY  LAPAROSCOPIC COLECTOMY  Surgeon: Surgeon(s) and Role:     * Shruthi Rico MD - Primary     * Elisa Goel PA-C - Assisting  Anesthesia: General   Estimated Blood Loss: 20mL    Drains: None  Specimens:   ID Type Source Tests Collected by Time Destination   1 : Right Colon Tissue Large Intestine, Colon SURGICAL PATHOLOGY EXAM Shruthi Rico MD 1/12/2022  2:33 PM      Findings:   4.5cm unresectable cecal polyp.  Complications: None.  Implants: * No implants in log *    Condition on discharge from OR: Satisfactory    ELISA GOEL PA-C   [facility]   [phone].        ADDENDUM:    PATIENT DATA  Indicate Y or N:  Home O2 No  Hemodialysis  No  Transplant patient  No  Cirrhosis  No  Steroids in last 30 days  No  Immunomodulators in last 30 days  No  Anticoagulation at time of surgery  No   List medication n  Prior abdominal surgery  Yes  Pelvic irradiation  No    Albumin within 30 days if known No results found for: ALBUMIN   Hgb within 30 days if known  No results found for: HGB]  Cr within 30 days if known  No results found for: CR]  Body mass index is 29.86 kg/m .      OR DATA  Emergent  No   <24 hours  No   <1 week  No  Bowel Prep Yes  Antibiotics  Yes  DVT prophylaxis    Heparin  Yes   SCD  Yes   None  No  Drain  No  ASA (1,2,3,4) 2  OR time (min) 165  Stents  No  Transfuse >/= 2U  No  Anastomosis   Stapled  Yes   Handsewn  No  Leak Test    Positive  No   Negative  No   Not done  Yes

## 2022-01-12 NOTE — PROGRESS NOTES
Dr. Rico at bedside - confirms orders for Flagyl and Ancef for antibiotics and asks preop RN to hold off from giving Heparin in preop. She would like the Heparin available in OR for a verbal order from her when she has completed a portion of her procedure. Medication labeled and bagged for OR staff.

## 2022-01-12 NOTE — ANESTHESIA PROCEDURE NOTES
Airway       Patient location during procedure: OR       Procedure Start/Stop Times: 1/12/2022 12:33 PM  Staff -        CRNA: Ignacio Diaz APRN CRNA       Performed By: CRNA  Consent for Airway        Urgency: elective  Indications and Patient Condition       Indications for airway management: leandro-procedural       Induction type:intravenous       Mask difficulty assessment: 1 - vent by mask    Final Airway Details       Final airway type: endotracheal airway       Successful airway: ETT - single  Endotracheal Airway Details        ETT size (mm): 7.0       Cuffed: yes       Successful intubation technique: video laryngoscopy       VL Blade Size: Glidescope 3       Grade View of Cords: 1       Adjucts: stylet       Position: Right       Measured from: gums/teeth       Secured at (cm): 21       Bite block used: None    Post intubation assessment        Placement verified by: capnometry, equal breath sounds and chest rise        Number of attempts at approach: 1       Number of other approaches attempted: 0       Secured with: pink tape       Ease of procedure: easy       Dentition: Intact and Unchanged

## 2022-01-12 NOTE — OP NOTE
OPERATIVE REPORT    DATE of SURGERY: January 12, 2022    PREOPERATIVE DIAGNOSIS:  Cecal polyp    POSTOPERATIVE DIAGNOSIS:  Same    OPERATION PERFORMED:  Laparoscopic right colectomy.    SURGEON:  Edda Rico M.D.    ASSISTANT:  Coni Rodriguez PA-C.    ANESTHESIA:  General.    INDICATIONS:  Armaan Bucio is a 76 year old With a history of gout, coronary artery disease, breast cancer who was hospitalized in March 2021 with sepsis.  She was ultimately discharged home and had a follow-up colonoscopy in found to have a large cecal polyp that was not amenable to endoscopic removal.  During her preoperative evaluation she was discovered to have severe aortic stenosis and so any surgical intervention for her polyp was delayed until she had an aortic valve replacement.  She tolerated that well and clinically improved.  She is on aspirin and Plavix and we waited several months until she was cleared from cardiology to hold her Plavix for procedure.  She underwent preoperative imaging which did not demonstrate any concerning findings.  We discussed the proposed procedure including performing an intraoperative colonoscopy with possible polypectomy and possible laparoscopic right colectomy.  We discussed the risks of procedure including bleeding, infection, damage to surrounding structures, anastomotic complications as well as cardiopulmonary complications.  She understands these risks and agrees to proceed.    OPERATIVE FINDINGS:  Intraoperative colonoscopy demonstrated a large flat/sessile polyp in the cecum approximately 5 mm in size.  This was adjacent to the appendiceal orifice and possibly involving the orifice and so endoscopic removal was not performed.  Patient had adhesions in the right upper quadrant from her prior cholecystectomy and her recent hepatic abscesses.  She had fairly redundant transverse colon.  The liver and small bowel were grossly normal.  Upon opening the specimen after removal, There was a 4-1/2 cm  polyp in the cecum.  There was also a 8 mm polyp just distal to the ileocecal valve.    PROCEDURE IN DETAIL:  After informed consent was obtained, the patient was brought to the operating room and intubated on the cart.  She was then placed in a left lateral decubitus position and the colonoscopy was performed.  Please see the Provation report for full details regarding this portion of the procedure.      She was then placed in the supine position on the operating room table.  Sequential compression devices were placed on the lower extremities.  A Cat catheter was inserted.  A Pink Pigazzi pad was used on the operating room table to prevent the patient from sliding off the table in steep Trendelenburg position.  Her abdomen was sterilely prepped and draped in usual fashion.    A 1.2 cm supraumbilical incision was made in the skin with a #15 blade.  This was carried down through the subcutaneous tissue and fascia with electrocautery.  The peritoneum was sharply entered.  A 12 mm Justino trocar was inserted through the supraumbilical incision.  The abdomen was insufflated with CO2 gas to a pressure of 15 mmHg.  A 10 mm 30 degree laparoscope was inserted into the abdomen through the supraumbilical port, and the abdomen was explored.  The operative findings are noted above.    Bilateral TAP blocks were performed by injecting a total of 30 mL of 0.5% Marcaine into the abdominal wall in 4 quadrants.  Two additional bladeless trocars were inserted into the abdominal cavity under direct observation.  A 5-mm trocar was placed on the left side of the abdomen 2 fingerbreadths medial and superior to the left superior iliac crest.  A 5-mm trocar was placed in the left upper quadrant 4 fingerbreadths above the left lower quadrant port.      There were some omental adhesions to the anterior abdominal wall just inferior to the umbilicus.  These were taken down using the LigaSure device.  There is also adhesions of the omentum in  the right upper quadrant and above the liver consistent with her history of cholecystectomy as well as her prior hepatic abscesses that were drained several months before.  These were taken down again with LigaSure device.  The cecum was grasped with an atraumatic bowel grasper and elevated toward the anterior abdominal wall exposing the origin of the ileocolic vessels on stretch.  A window was created inferiorly and posteriorly to the ileocolic vessels, and the underside of the right colon was dissected free from the retroperitoneum using blunt dissection with cautery.  Dissection proceeded underneath the mesentery of the colon caudally along the right paracolic gutter toward the hepatic flexure.  The duodenum was carefully identified and preserved.  The ileocolic vessels were divided at their base using the LigaSure device with a quadruple-seal technique.  Hemostasis was evident.    Dissection continued underneath the mesentery of the colon up to the duodenum, which was identified and preserved as above.  The retroperitoneal structures were bluntly dissected inferiorly away from the overlying mesentery.  The cecum and right colon were then reflected medially, and any remaining lateral peritoneal attachments were taken down using hook Bovie electrocautery.  The hepatic flexure was completely mobilized using a combination of the hook Bovie electrocautery and Ligasure device.  The omentum was removed from the proximal transverse colon up to the falciform ligament.  Additional lateral peritoneal attachments from the terminal ileum were taken down using the hook Bovie electrocautery.    Having completely mobilized the entire right colon, preparation was made for the open part of the procedure.  The supraumbilical port was used to open a 5-cm supraumbilical vertical midline incision using Bovie electrocautery.  A medium Chris wound retractor was inserted into the incision.  The terminal ileum and right colon were  easily delivered into the wound, and mobility was excellent.  The terminal ileum was divided approximately 10 cm proximal to the ileocecal valve using a NOBLE 75 mm stapler.  The terminal ileal mesentery was taken down and divided radially using the LigaSure device. The transverse colon distal to the divided right branch of the middle colic artery was divided using a NOBLE 75-mm stapler.  The intervening colonic mesentery distal to the divided ileocolic vessels was taken down and divided using the LigaSure device.  The specimen was removed from the operative field and submitted for pathological analysis.  The specimen was opened on the back table revealing the above operative findings.    The terminal ileum and divided colon were brought into close proximity for a side-to-side anastomosis.  Careful attention was made that the terminal ileum was not twisted prior to the anastomosis.  The 2 corners of each staple line of the terminal ileum and transected transverse colon were excised using a curved Jenkins scissors, and a side-to-side functional end-to-end anastomosis was constructed using the NOBLE 75-mm stapler.  A suture of 3-0 Vicryl was placed at the crotch of the staple line to avoid tension.  The staple hole was then closed using a TA90 linear stapler, and the staple line was oversewn and inverted using interrupted 3-0 Vicryl Lembert sutures.  Both the terminal ileum and colon were pink and viable with excellent blood supply at the anastomosis.  There was no tension on the anastomosis.      The fascia of the small supraumbilical vertical midline incision was closed using a 0-looped PDS suture from either end.  All wounds were irrigated with saline solution, and the skin was closed using a combination of buried interrupted subdermal 4-0 Monocryl sutures in the port sites and a running 4-0 Monocryl subcuticular suture and the small supraumbilical midline incision.  Dermabond was applied to all incisions.    The patient  tolerated the procedure well without complications.  Estimated blood loss was 20 mL.  I was present and scrubbed for the entire duration of the operation.  The patient was extubated in the operating room and brought to the recovery room in stable condition.      Edda Rico MD

## 2022-01-13 LAB
ANION GAP SERPL CALCULATED.3IONS-SCNC: 5 MMOL/L (ref 3–14)
BUN SERPL-MCNC: 10 MG/DL (ref 7–30)
CALCIUM SERPL-MCNC: 8.9 MG/DL (ref 8.5–10.1)
CHLORIDE BLD-SCNC: 110 MMOL/L (ref 94–109)
CO2 SERPL-SCNC: 24 MMOL/L (ref 20–32)
CREAT SERPL-MCNC: 0.58 MG/DL (ref 0.52–1.04)
ERYTHROCYTE [DISTWIDTH] IN BLOOD BY AUTOMATED COUNT: 15.9 % (ref 10–15)
GFR SERPL CREATININE-BSD FRML MDRD: >90 ML/MIN/1.73M2
GLUCOSE BLD-MCNC: 88 MG/DL (ref 70–99)
GLUCOSE BLDC GLUCOMTR-MCNC: 91 MG/DL (ref 70–99)
HCT VFR BLD AUTO: 31.9 % (ref 35–47)
HGB BLD-MCNC: 10.3 G/DL (ref 11.7–15.7)
MAGNESIUM SERPL-MCNC: 2.1 MG/DL (ref 1.6–2.3)
MCH RBC QN AUTO: 32.4 PG (ref 26.5–33)
MCHC RBC AUTO-ENTMCNC: 32.3 G/DL (ref 31.5–36.5)
MCV RBC AUTO: 100 FL (ref 78–100)
PHOSPHATE SERPL-MCNC: 3 MG/DL (ref 2.5–4.5)
PLATELET # BLD AUTO: 147 10E3/UL (ref 150–450)
POTASSIUM BLD-SCNC: 3.8 MMOL/L (ref 3.4–5.3)
RBC # BLD AUTO: 3.18 10E6/UL (ref 3.8–5.2)
SODIUM SERPL-SCNC: 139 MMOL/L (ref 133–144)
WBC # BLD AUTO: 7.4 10E3/UL (ref 4–11)

## 2022-01-13 PROCEDURE — 120N000001 HC R&B MED SURG/OB

## 2022-01-13 PROCEDURE — 85027 COMPLETE CBC AUTOMATED: CPT | Performed by: COLON & RECTAL SURGERY

## 2022-01-13 PROCEDURE — 84100 ASSAY OF PHOSPHORUS: CPT | Performed by: COLON & RECTAL SURGERY

## 2022-01-13 PROCEDURE — 250N000011 HC RX IP 250 OP 636: Performed by: PHYSICIAN ASSISTANT

## 2022-01-13 PROCEDURE — 250N000013 HC RX MED GY IP 250 OP 250 PS 637: Performed by: COLON & RECTAL SURGERY

## 2022-01-13 PROCEDURE — 36415 COLL VENOUS BLD VENIPUNCTURE: CPT | Performed by: COLON & RECTAL SURGERY

## 2022-01-13 PROCEDURE — 83735 ASSAY OF MAGNESIUM: CPT | Performed by: COLON & RECTAL SURGERY

## 2022-01-13 PROCEDURE — 80048 BASIC METABOLIC PNL TOTAL CA: CPT | Performed by: COLON & RECTAL SURGERY

## 2022-01-13 PROCEDURE — 250N000011 HC RX IP 250 OP 636: Performed by: COLON & RECTAL SURGERY

## 2022-01-13 PROCEDURE — 99232 SBSQ HOSP IP/OBS MODERATE 35: CPT | Performed by: STUDENT IN AN ORGANIZED HEALTH CARE EDUCATION/TRAINING PROGRAM

## 2022-01-13 RX ADMIN — CEFAZOLIN 1 G: 1 INJECTION, POWDER, FOR SOLUTION INTRAMUSCULAR; INTRAVENOUS at 13:22

## 2022-01-13 RX ADMIN — ACETAMINOPHEN 975 MG: 325 TABLET, FILM COATED ORAL at 20:35

## 2022-01-13 RX ADMIN — OXYCODONE HYDROCHLORIDE 5 MG: 5 TABLET ORAL at 21:57

## 2022-01-13 RX ADMIN — ACETAMINOPHEN 975 MG: 325 TABLET, FILM COATED ORAL at 03:51

## 2022-01-13 RX ADMIN — ALLOPURINOL 300 MG: 300 TABLET ORAL at 20:35

## 2022-01-13 RX ADMIN — ENOXAPARIN SODIUM 40 MG: 40 INJECTION SUBCUTANEOUS at 09:18

## 2022-01-13 RX ADMIN — DORZOLAMIDE HCL 1 DROP: 20 SOLUTION/ DROPS OPHTHALMIC at 08:01

## 2022-01-13 RX ADMIN — LETROZOLE 2.5 MG: 2.5 TABLET ORAL at 20:35

## 2022-01-13 RX ADMIN — LATANOPROST 1 DROP: 50 SOLUTION/ DROPS OPHTHALMIC at 20:42

## 2022-01-13 RX ADMIN — DORZOLAMIDE HCL 1 DROP: 20 SOLUTION/ DROPS OPHTHALMIC at 20:38

## 2022-01-13 RX ADMIN — OXYCODONE HYDROCHLORIDE 5 MG: 5 TABLET ORAL at 17:47

## 2022-01-13 RX ADMIN — OXYCODONE HYDROCHLORIDE 5 MG: 5 TABLET ORAL at 07:52

## 2022-01-13 RX ADMIN — CEFAZOLIN 1 G: 1 INJECTION, POWDER, FOR SOLUTION INTRAMUSCULAR; INTRAVENOUS at 05:28

## 2022-01-13 RX ADMIN — METRONIDAZOLE 500 MG: 500 INJECTION, SOLUTION INTRAVENOUS at 07:54

## 2022-01-13 RX ADMIN — OXYCODONE HYDROCHLORIDE 5 MG: 5 TABLET ORAL at 13:22

## 2022-01-13 RX ADMIN — ACETAMINOPHEN 975 MG: 325 TABLET, FILM COATED ORAL at 10:59

## 2022-01-13 ASSESSMENT — ACTIVITIES OF DAILY LIVING (ADL)
ADLS_ACUITY_SCORE: 6
ADLS_ACUITY_SCORE: 4
ADLS_ACUITY_SCORE: 6
ADLS_ACUITY_SCORE: 4
ADLS_ACUITY_SCORE: 6
ADLS_ACUITY_SCORE: 6
ADLS_ACUITY_SCORE: 4
ADLS_ACUITY_SCORE: 6
ADLS_ACUITY_SCORE: 4
ADLS_ACUITY_SCORE: 6
ADLS_ACUITY_SCORE: 4
ADLS_ACUITY_SCORE: 6
ADLS_ACUITY_SCORE: 6

## 2022-01-13 NOTE — PROGRESS NOTES
[facility]    Medicine Progress Note - Hospitalist Service       Date of Admission:  1/12/2022    Assessment & Plan        Armaan Bucio is a pleasant 76 year old with a past medical history of gout, coronary artery disease, aortic stenosis status post TAVR, and breast cancer who underwent a scheduled laparoscopic right colectomy for a cecal polyp on 1/13/2022.  The hospitalist service was consulted for postoperative medical comanagement.     Cecal polyp, status post laparoscopic right colectomy 1/13/2022  -- Defer routine postoperative cares including pain management and resumption of antiplatelet medication/anticoagulation to colorectal surgery  -- Monitor intake and output, diet advancement per CRS     History of severe aortic stenosis, status post aortic valve replacement  Patient had a transcatheter aortic valve replacement done on 9/1/2021.  She has been doing well reportedly and cardiology has seen her and cleared her for surgery.  Was on aspirin and Plavix prior to surgery, which is now on hold.  -- Resume antiplatelet therapy as able when okay with CRS     Hx of Breast cancer  Diagnosed last year.  Patient currently on letrozole and follows with oncology (Dr. Masterson).   -- Recommend continued outpatient follow-up.     Hx of Gout  No active flare  --Resume PTA allopurinol 300 mg daily     Hx of asthma  --Currently stable.  Monitor pulse oximetry and capnography.          Diet: Full Liquid Diet    DVT Prophylaxis: lovenox when safe from CRS  Cat Catheter: Not present  Central Lines: None  Code Status: Full Code      Disposition Plan   Expected Discharge: 01/14/2022     Anticipated discharge location:  Awaiting care coordination huddle  Delays:           The patient's care was discussed with the  patient  Nena Swan DO  Hospitalist Service  [facility]  Securely message with the Vocera Web Console (learn more here)  Text page via [employer]  Paging/Directory        Clinically Significant Risk Factors Present on Admission              # Platelet Defect: home medication list includes an antiplatelet medication   # Overweight: last Body mass index is 29.86 kg/m .      ______________________________________________________________________    Interval History   Patient doing well and up in chair. She reports tolerating some soup however hot cereal did not go well. No chest pain. No issues breathing. Ambulating without difficulty.     Data reviewed today: I reviewed all medications, new labs and imaging results over the last 24 hours.    Physical Exam   Vital Signs: Temp: 98.9  F (37.2  C) Temp src: Oral BP: 136/69 Pulse: 89   Resp: 16 SpO2: 94 % O2 Device: None (Room air) Oxygen Delivery: 1/2 LPM  Weight: 138 lbs 0 oz     Constitutional: Awake, alert, cooperative, no apparent distress  Respiratory: Clear to auscultation bilaterally, no crackles or wheezing  Cardiovascular: Regular rate and rhythm, normal S1 and S2, and no murmur noted  GI: Normal bowel sounds, soft, non-distended, midline incision is clean and dry, no pain  Skin/Integumen: No rashes, no cyanosis, no edema  Other:     Data   Recent Labs   Lab 01/13/22  0754 01/13/22  0607   WBC 7.4  --    HGB 10.3*  --      --    *  --      --    POTASSIUM 3.8  --    CHLORIDE 110*  --    CO2 24  --    BUN 10  --    CR 0.58  --    ANIONGAP 5  --    VANESSA 8.9  --    GLC 88 91     No results found for this or any previous visit (from the past 24 hour(s)).  Medications     lactated ringers 25 mL/hr at 01/12/22 1013     lactated ringers 75 mL/hr at 01/12/22 1715       acetaminophen  975 mg Oral Q8H     allopurinol  300 mg Oral QPM     ceFAZolin  1 g Intravenous Q8H     dorzolamide  1 drop Both Eyes BID     enoxaparin ANTICOAGULANT  40 mg Subcutaneous Q24H     latanoprost  1 drop Both Eyes At Bedtime     letrozole  2.5 mg Oral or Feeding Tube QPM     sodium chloride (PF)  3 mL Intracatheter Q8H

## 2022-01-13 NOTE — ANESTHESIA POSTPROCEDURE EVALUATION
Patient: Armaan Bucio    Procedure: Procedure(s):  INTRA-OPERATIVE COLONOSCOPY  LAPAROSCOPIC COLECTOMY       Diagnosis:Colon polyp [K63.5]  Diagnosis Additional Information: No value filed.    Anesthesia Type:  General    Note:     Postop Pain Control: Uneventful            Sign Out: Well controlled pain   PONV: No   Neuro/Psych: Uneventful            Sign Out: Acceptable/Baseline neuro status   Airway/Respiratory: Uneventful            Sign Out: Acceptable/Baseline resp. status   CV/Hemodynamics: Uneventful            Sign Out: Acceptable CV status; No obvious hypovolemia; No obvious fluid overload   Other NRE: NONE   DID A NON-ROUTINE EVENT OCCUR? No           Last vitals:  Vitals Value Taken Time   /80 01/12/22 1640   Temp 36.4  C (97.5  F) 01/12/22 1640   Pulse 77 01/12/22 1643   Resp 14 01/12/22 1643   SpO2 97 % 01/12/22 1643   Vitals shown include unvalidated device data.    Electronically Signed By: Nikhil Plascencia MD  January 12, 2022  6:16 PM

## 2022-01-13 NOTE — PLAN OF CARE
Pt is alert and oriented x 4, AVSS on room air. No SOB, lungs diminished. Bowel sounds hypoactive, no N/V. Pain controlled with tylenol, dilaudid. Cat with adequate urine output. Pt tolerating oral fluids. Saline locked. Incision CDI. Out of bed x 1 with 1 assist.

## 2022-01-13 NOTE — PROGRESS NOTES
COLON & RECTAL SURGERY  PROGRESS NOTE    January 13, 2022  Post-op Day # 1 intraoperative colonoscopy, laparoscopic right colectomy    SUBJECTIVE:  Doing well, up in chair eating breakfast. Abdominal pain controlled. Tolerating FLD, no n/v. No flatus or BM. Cat removed this AM, has not voided yet. AVSS, HTN. WBC 7.4. Hgb 10.3. Cr 0.58.    OBJECTIVE:  Temp:  [97  F (36.1  C)-99.4  F (37.4  C)] 99.4  F (37.4  C)  Pulse:  [67-77] 68  Resp:  [9-25] 16  BP: (137-170)/() 151/78  SpO2:  [93 %-100 %] 95 %    Intake/Output Summary (Last 24 hours) at 1/13/2022 0757  Last data filed at 1/13/2022 0600  Gross per 24 hour   Intake 2926.25 ml   Output 1275 ml   Net 1651.25 ml       GENERAL:  Awake, alert, no acute distress  HEAD: Normocephalic atraumatic  SCLERA: Anicteric  EXTREMITIES: Warm and well perfused  ABDOMEN:  Soft, appropriately tender, non-distended. No guarding, rigidity, or peritoneal signs.   INCISION:  C/d/i    LABS:  No results found for: WBC  No results found for: HGB  No results found for: HCT  No results found for: PLT  Last Basic Metabolic Panel:  No results found for: NA   No results found for: POTASSIUM  No results found for: CHLORIDE  No results found for: VANESSA  No results found for: CO2  No results found for: BUN  No results found for: CR  Lab Results   Component Value Date    GLC 91 01/13/2022       ASSESSMENT/PLAN: 75 yo F with a history of gout, CAD, breast cancer, TAVR, and large unresectable cecal polyp now POD#1 s/p laparoscopic right colectomy. Stable. Await return of bowel function. Will start Lovenox for ppx, hold PTA ASA & Plavix. Management of comorbidities per hospitalist, appreciate assistance.      1. Full liquid diet. Potentially low fiber later today if passing flatus.  2. PRN pain meds  3. Stop IVF once adequate PO intake  4. Cat out, awaiting void  5. OOB, ambulate  6. Await path   7. Lovenox for ppx. Hold PTA ASA & Plavix  8. Management of comorbidities per hospitalist,  appreciate assistance    Discussed with Dr. Rico.    For questions/paging, please contact the CRS office at [phone].    Christian Elaine PA-C  Colorectal Physician Assistant    [facility]  [address]  [place] [address]  T: [phone]  F: [phone]        [facility]  I performed a history and physical examination of the patient and discussed their management with the physician assistant. I reviewed the physician assistants note and agree with the documented findings and plan of care.     Doing well.  Minimal pain.  Cat out this am.  No flatus or BM. Vitals stable    Alert, NAD  abd soft, minimal ttp per incisions  Incisions c/d/i    Plan  Fulls  Dec IVFs  IV/PO pain meds  Start lovenox    Edda Rico MD, FACS    [facility]  [address]  [place] [address]  T: [phone]  F: [phone]

## 2022-01-13 NOTE — CONSULTS
[facility]  Consult Note - Hospitalist Service     Date of Admission:  1/12/2022  Consult Requested by: Dr. Shruhti Rico   Reason for Consult: Management of medical comorbities      Assessment & Plan   Armaan Bucio is a pleasant 76 year old with a past medical history of gout, coronary artery disease, aortic stenosis status post TAVR, and breast cancer who underwent a scheduled laparoscopic right colectomy for a cecal polyp on 1/13/2022.  The hospitalist service was consulted for postoperative medical comanagement.    Cecal polyp, status post laparoscopic right colectomy 1/13/2022  -- Defer routine postoperative cares including pain management and resumption of antiplatelet medication/anticoagulation to colorectal surgery  -- Recommend incentive spirometry, pulmonary hygiene, early out of bed as able  -- Monitor intake and output, diet advancement per CRS  -- Check BMP and CBC in a.m.    History of severe aortic stenosis, status post aortic valve replacement  Patient had a transcatheter aortic valve replacement done on 9/1/2021.  She has been doing well reportedly and cardiology has seen her and cleared her for surgery.  Was on aspirin and Plavix prior to surgery, which is now on hold.  -- Resume antiplatelet therapy as able when okay with CRS    Hx of Breast cancer  Diagnosed last year.  Patient currently on letrozole and follows with oncology (Dr. Masterson).   -- Recommend continued outpatient follow-up.    Hx of Gout  No active flare  --Resume PTA allopurinol 300 mg daily    Hx of asthma  --Currently stable.  Monitor pulse oximetry and capnography.     The patient's care was discussed with Dr. Mckeon of the hospitalist service.  He is in agreement with my assessment and plan of care.    MARITZA Rae  [facility]  Securely message with the Vocera Web Console (learn more here)  Text page via [employer] Paging/[employer]        Clinically  Significant Risk Factors Present on Admission              # Platelet Defect: home medication list includes an antiplatelet medication   # Overweight: last Body mass index is 29.86 kg/m .      ______________________________________________________________________        History is obtained from the patient and review of the EMR.    History of Present Illness   Armaan Bucio is a pleasant 76 year old with a past medical history of gout, coronary artery disease, aortic stenosis status post TAVR, and breast cancer who underwent a scheduled laparoscopic right colectomy for a cecal polyp on 1/13/2022.  This patient was hospitalized in March 2021 with sepsis.  She recovered and was ultimately discharged home.  She had a follow-up colonoscopy and was found to have a large cecal polyp that was not amenable to endoscopic removal.  During her preoperative evaluation, she was discovered to have severe aortic stenosis and so any surgical intervention for her polyp was delayed until she had an aortic valve replacement.  She underwent a TAVR which she tolerated well, and clinically improved.  She is on aspirin and Plavix since her valve replacement.  Colorectal Surgery had to wait several months until she was cleared from Cardiology to hold her Plavix for procedure.  She underwent preoperative imaging which did not demonstrate any concerning findings.      Today 1/12/2022, she underwent laparoscopic right colectomy by colorectal surgery.  This was performed under general anesthesia.  There is a 4.5 cm unresectable cecal polyp seen.  No immediate complications reported.  EBL 20 cc.  Patient has been admitted to the general surgery floor for postoperative management.  Patient is sleepy on my arrival.  Denies any fever, chills, chest pain, shortness of breath, nausea, vomiting, diarrhea, dysuria, focal weakness or numbness.  She has appropriate abdominal pain after surgery.  No other acute concerns at this time.    Review of  Systems   The 10 point Review of Systems is negative other than noted in the HPI.    Past Medical History    I have reviewed this patient's medical history and updated it with pertinent information if needed.   Past Medical History:   Diagnosis Date     Aortic stenosis      Arthritis     Osteoarthritis     Breast cancer (H)      Breast mass in female      Gout      Hypertension      Normocytic anemia      Obesity      Uncomplicated asthma        Past Surgical History   I have reviewed this patient's surgical history and updated it with pertinent information if needed.  Past Surgical History:   Procedure Laterality Date     BACK SURGERY      Lumbar Disc Surgery     CARDIAC SURGERY      TAVR 09/2021     CHOLECYSTECTOMY       GYN SURGERY      FELICITA     ORTHOPEDIC SURGERY Left     Knee replacement      ORTHOPEDIC SURGERY Right     Knee Replacement     ORTHOPEDIC SURGERY Right     Carpal Tunnel Release       Social History   I have reviewed this patient's social history and updated it with pertinent information if needed.  Social History     Tobacco Use     Smoking status: Never Smoker     Smokeless tobacco: Never Used   Substance Use Topics     Alcohol use: Yes     Comment: one drink daily- socorro      Drug use: None       Family History   No significant past medical history.  On review, mother with stroke, father with colon cancer, prostate cancer with metastases.    Medications   Medications Prior to Admission   Medication Sig Dispense Refill Last Dose     acetaminophen (TYLENOL) 500 MG tablet Take 500-1,000 mg by mouth every 6 hours as needed for mild pain   1/11/2022 at 0800     albuterol (PROAIR HFA/PROVENTIL HFA/VENTOLIN HFA) 108 (90 Base) MCG/ACT inhaler Inhale 1-2 puffs into the lungs every 6 hours as needed for shortness of breath / dyspnea    More than a month at PRN     allopurinol (ZYLOPRIM) 300 MG tablet Take 300 mg by mouth every evening    1/10/2022 at PM     aspirin (ASA) 81 MG chewable tablet Take 81 mg  by mouth daily   1/6/2022 at AM     dorzolamide (TRUSOPT) 2 % ophthalmic solution Place 1 drop into both eyes 2 times daily    1/11/2022 at PM     latanoprost (XALATAN) 0.005 % ophthalmic solution Place 1 drop into both eyes At Bedtime    1/11/2022 at PM     letrozole (FEMARA) 2.5 MG tablet Take 2.5 mg by mouth every evening   1/10/2022 at PM     ondansetron (ZOFRAN-ODT) 4 MG ODT tab Take 4 mg by mouth every 6 hours as needed for nausea or vomiting    1/11/2022 at PRN     metroNIDAZOLE (FLAGYL) 500 MG tablet Take 500 mg by mouth See Admin Instructions Three times day before surgery; at 1pm, 2pm, and 11pm   1/11/2022 at 2300     neomycin (MYCIFRADIN) 500 MG tablet Take 500 mg by mouth See Admin Instructions Three times day before surgery; at 1pm, 2pm, and 11pm   1/11/2022 at 2300       Allergies   Allergies   Allergen Reactions     Bee Venom Anaphylaxis       Physical Exam   Vital Signs: Temp: 97  F (36.1  C) Temp src: Oral BP: (!) 146/80 Pulse: 73   Resp: 16 SpO2: 96 % O2 Device: Nasal cannula Oxygen Delivery: 2 LPM  Weight: 138 lbs 0 oz    Physical Exam   Temp: 97  F (36.1  C) Temp src: Oral BP: (!) 146/80 Pulse: 73   Resp: 16 SpO2: 96 % O2 Device: Nasal cannula Oxygen Delivery: 2 LPM  Vital Signs with Ranges  Temp:  [97  F (36.1  C)-97.5  F (36.4  C)] 97  F (36.1  C)  Pulse:  [67-77] 73  Resp:  [9-25] 16  BP: (140-170)/() 146/80  SpO2:  [93 %-100 %] 96 %  138 lbs 0 oz    Constitutional: Awake, alert, cooperative, no apparent distress.    ENT: Normocephalic, without obvious abnormality, MMM. Eyes pupils are equal, round.  Extra occular movements intact.  Normal sclera.    Neck: Supple  Pulmonary: No increased work of breathing, good air exchange, clear to auscultation bilaterally, no crackles or wheezing.  Cardiovascular: Regular rate and rhythm, normal S1 and S2, and no murmur noted.  GI: postsurgical abdomen.  Hypoactive BS.  Skin/Integumen: Warm, dry, no rashes on exposed skin  Neuro: CN II-XII grossly  intact.  Grossly moves all extremities equally.  Speech normal.  Psych:  Alert and oriented to self, place, situation. Normal affect.  Extremities: No lower extremity edema noted.      Data   Results for orders placed or performed in visit on 01/12/22 (from the past 24 hour(s))   Airway    Narrative    Ignacio Diaz APRN CRNA     1/12/2022 12:36 PM  Airway       Patient location during procedure: OR       Procedure Start/Stop Times: 1/12/2022 12:33 PM  Staff -        CRNA: Ignacio Diaz APRN CRNA       Performed By: CRNA  Consent for Airway        Urgency: elective  Indications and Patient Condition       Indications for airway management: leandro-procedural       Induction type:intravenous       Mask difficulty assessment: 1 - vent by mask    Final Airway Details       Final airway type: endotracheal airway       Successful airway: ETT - single  Endotracheal Airway Details        ETT size (mm): 7.0       Cuffed: yes       Successful intubation technique: video laryngoscopy       VL Blade Size: Glidescope 3       Grade View of Cords: 1       Adjucts: stylet       Position: Right       Measured from: gums/teeth       Secured at (cm): 21       Bite block used: None    Post intubation assessment        Placement verified by: capnometry, equal breath sounds and chest rise          Number of attempts at approach: 1       Number of other approaches attempted: 0       Secured with: pink tape       Ease of procedure: easy       Dentition: Intact and Unchanged

## 2022-01-13 NOTE — PLAN OF CARE
Pt to room 2212 via cart from PACU at 1710.  A&OX4, VSS, pain controlled with pain meds.  Abdominal lap sites CD&I with liquid bandage.  Hypoactive BS, no flatus, no nausea.  Tolerating sips and chips currently.  Cat patent with clear yellow urine.

## 2022-01-14 LAB
GLUCOSE BLDC GLUCOMTR-MCNC: 92 MG/DL (ref 70–99)
HGB BLD-MCNC: 9.6 G/DL (ref 11.7–15.7)
PATH REPORT.COMMENTS IMP SPEC: NORMAL
PATH REPORT.COMMENTS IMP SPEC: NORMAL
PATH REPORT.FINAL DX SPEC: NORMAL
PATH REPORT.GROSS SPEC: NORMAL
PATH REPORT.MICROSCOPIC SPEC OTHER STN: NORMAL
PHOTO IMAGE: NORMAL

## 2022-01-14 PROCEDURE — 120N000001 HC R&B MED SURG/OB

## 2022-01-14 PROCEDURE — 250N000011 HC RX IP 250 OP 636: Performed by: COLON & RECTAL SURGERY

## 2022-01-14 PROCEDURE — 85018 HEMOGLOBIN: CPT | Performed by: PHYSICIAN ASSISTANT

## 2022-01-14 PROCEDURE — 88309 TISSUE EXAM BY PATHOLOGIST: CPT | Mod: 26 | Performed by: PATHOLOGY

## 2022-01-14 PROCEDURE — 36415 COLL VENOUS BLD VENIPUNCTURE: CPT | Performed by: PHYSICIAN ASSISTANT

## 2022-01-14 PROCEDURE — 250N000011 HC RX IP 250 OP 636: Performed by: PHYSICIAN ASSISTANT

## 2022-01-14 PROCEDURE — 99232 SBSQ HOSP IP/OBS MODERATE 35: CPT | Performed by: STUDENT IN AN ORGANIZED HEALTH CARE EDUCATION/TRAINING PROGRAM

## 2022-01-14 PROCEDURE — 250N000013 HC RX MED GY IP 250 OP 250 PS 637: Performed by: COLON & RECTAL SURGERY

## 2022-01-14 RX ORDER — OXYCODONE HYDROCHLORIDE 5 MG/1
5 TABLET ORAL EVERY 6 HOURS PRN
Qty: 20 TABLET | Refills: 0 | Status: SHIPPED | OUTPATIENT
Start: 2022-01-14

## 2022-01-14 RX ADMIN — ACETAMINOPHEN 975 MG: 325 TABLET, FILM COATED ORAL at 21:10

## 2022-01-14 RX ADMIN — ALLOPURINOL 300 MG: 300 TABLET ORAL at 21:09

## 2022-01-14 RX ADMIN — ONDANSETRON 4 MG: 4 TABLET, ORALLY DISINTEGRATING ORAL at 05:28

## 2022-01-14 RX ADMIN — ACETAMINOPHEN 975 MG: 325 TABLET, FILM COATED ORAL at 05:29

## 2022-01-14 RX ADMIN — DORZOLAMIDE HCL 1 DROP: 20 SOLUTION/ DROPS OPHTHALMIC at 09:01

## 2022-01-14 RX ADMIN — ACETAMINOPHEN 975 MG: 325 TABLET, FILM COATED ORAL at 13:19

## 2022-01-14 RX ADMIN — LATANOPROST 1 DROP: 50 SOLUTION/ DROPS OPHTHALMIC at 21:10

## 2022-01-14 RX ADMIN — ENOXAPARIN SODIUM 40 MG: 40 INJECTION SUBCUTANEOUS at 09:00

## 2022-01-14 RX ADMIN — LETROZOLE 2.5 MG: 2.5 TABLET ORAL at 22:27

## 2022-01-14 RX ADMIN — DORZOLAMIDE HCL 1 DROP: 20 SOLUTION/ DROPS OPHTHALMIC at 21:10

## 2022-01-14 ASSESSMENT — ACTIVITIES OF DAILY LIVING (ADL)
ADLS_ACUITY_SCORE: 8
ADLS_ACUITY_SCORE: 6
ADLS_ACUITY_SCORE: 8
ADLS_ACUITY_SCORE: 6
ADLS_ACUITY_SCORE: 8
ADLS_ACUITY_SCORE: 6
ADLS_ACUITY_SCORE: 8
ADLS_ACUITY_SCORE: 8

## 2022-01-14 NOTE — PROGRESS NOTES
COLON & RECTAL SURGERY  PROGRESS NOTE    January 14, 2022  Post-op Day # 2    SUBJECTIVE:  Doing well, sitting up in chair eating breakfast. Tolerating full liquids. No n/v. +flatus, -BM. Ambulating. Pain controlled.     OBJECTIVE:  Temp:  [97.8  F (36.6  C)-98.9  F (37.2  C)] 98.3  F (36.8  C)  Pulse:  [83-89] 83  Resp:  [16-18] 16  BP: (136-156)/(69-88) 144/76  SpO2:  [92 %-94 %] 93 %    Intake/Output Summary (Last 24 hours) at 1/14/2022 0808  Last data filed at 1/14/2022 0549  Gross per 24 hour   Intake 1200 ml   Output 625 ml   Net 575 ml       GENERAL:  Awake, alert, no acute distress  HEAD: Normocephalic atraumatic  SCLERA: Anicteric  EXTREMITIES: Warm and well perfused  ABDOMEN:  Soft, appropriately tender, non-distended. No guarding, rigidity, or peritoneal signs.   INCISION:  C/d/i    LABS:  Lab Results   Component Value Date    WBC 7.4 01/13/2022     Lab Results   Component Value Date    HGB 9.6 01/14/2022     Lab Results   Component Value Date    HCT 31.9 01/13/2022     Lab Results   Component Value Date     01/13/2022     Last Basic Metabolic Panel:  Lab Results   Component Value Date     01/13/2022      Lab Results   Component Value Date    POTASSIUM 3.8 01/13/2022     Lab Results   Component Value Date    CHLORIDE 110 01/13/2022     Lab Results   Component Value Date    VANESSA 8.9 01/13/2022     Lab Results   Component Value Date    CO2 24 01/13/2022     Lab Results   Component Value Date    BUN 10 01/13/2022     Lab Results   Component Value Date    CR 0.58 01/13/2022     Lab Results   Component Value Date    GLC 92 01/14/2022    GLC 88 01/13/2022       ASSESSMENT/PLAN: 77 yo F with a history of gout, CAD, breast cancer, TAVR, and large unresectable cecal polyp now POD#2 s/p laparoscopic right colectomy    1. Advance to low fiber diet  2. PRN pain meds  3. Discontinue IVF  4. Cat out and voiding  5. OOB, ambulate  6. Lovenox for ppx  7. Plan to restart ASA at discharge and Plavix POD#5  (Monday)  8. Likely discharge tomorrow.     Discussed with Dr. Rico.     For questions/paging, please contact the CRS office at [phone].    Coni Rodriguez (Hermes), PA-C  Colorectal Physician Assistant    [facility]  [address] [place] [id number]  T: [phone]  F: [phone]

## 2022-01-14 NOTE — PLAN OF CARE
A&OX4, VSS, pain controlled with po pain meds.  Up ambulating with SBA, gait belt and walker.  Incision CD&I with liquid bandaid.  Positive BS, passing flatus, tolerating full liquid diet without nausea.  Voiding well.

## 2022-01-14 NOTE — PROGRESS NOTES
[facility]    Medicine Progress Note - Hospitalist Service       Date of Admission:  1/12/2022    Assessment & Plan        Armaan Bucio is a pleasant 76 year old with a past medical history of gout, coronary artery disease, aortic stenosis status post TAVR, and breast cancer who underwent a scheduled laparoscopic right colectomy for a cecal polyp on 1/13/2022.  The hospitalist service was consulted for postoperative medical comanagement.     Cecal polyp, status post laparoscopic right colectomy 1/13/2022  -- Defer routine postoperative cares including pain management and resumption of antiplatelet medication/anticoagulation to colorectal surgery  -- diet advancement per CR  -- patient have many stools today, encouraged small meals and to take things slow.  -- Will place bedside commode     History of severe aortic stenosis, status post aortic valve replacement  Patient had a transcatheter aortic valve replacement done on 9/1/2021.  She has been doing well reportedly and cardiology has seen her and cleared her for surgery.  Was on aspirin and Plavix prior to surgery, which is now on hold.  -- Resume antiplatelet therapy as able when okay with CRS     Hx of Breast cancer  Diagnosed last year.  Patient currently on letrozole and follows with oncology (Dr. Masterson).   -- Recommend continued outpatient follow-up.     Hx of Gout  No active flare  --Resume PTA allopurinol 300 mg daily     Hx of asthma  --Currently stable.  Monitor pulse oximetry and capnography.          Diet: Low Fiber Diet  Diet    DVT Prophylaxis: lovenox when safe from CRS  Cat Catheter: Not present  Central Lines: None  Code Status: Full Code      Disposition Plan   Expected Discharge: 01/15/2022     Anticipated discharge location:  Awaiting care coordination huddle  Delays:           The patient's care was discussed with the  patient  Nena Swan DO  Hospitalist Service  [facility]  Hospital  Securely message with the Vocera Web Console (learn more here)  Text page via [email or web address] Paging/Directory        Clinically Significant Risk Factors Present on Admission              # Overweight: last Body mass index is 29.86 kg/m .      ______________________________________________________________________    Interval History   Patient reports she is feeling great except having a lot of diarrhea. 4 loose stool today. No pain they just come on quickly. No breathing issues. No cough.     Data reviewed today: I reviewed all medications, new labs and imaging results over the last 24 hours.    Physical Exam   Vital Signs: Temp: 99.9  F (37.7  C) Temp src: Oral BP: (!) 156/85 Pulse: 94   Resp: 18 SpO2: 94 % O2 Device: None (Room air)    Weight: 138 lbs 0 oz     Constitutional: Awake, alert, cooperative, no apparent distress  Respiratory: Clear to auscultation bilaterally, no crackles or wheezing  Cardiovascular: Regular rate and rhythm, normal S1 and S2, and no murmur noted  GI: Normal bowel sounds, soft, non-distended, midline incision is clean and dry, no pain  Skin/Integumen: No rashes, no cyanosis, no edema  Other:     Data   Recent Labs   Lab 01/14/22  0734 01/14/22  0615 01/13/22  0754 01/13/22  0607   WBC  --   --  7.4  --    HGB 9.6*  --  10.3*  --    MCV  --   --  100  --    PLT  --   --  147*  --    NA  --   --  139  --    POTASSIUM  --   --  3.8  --    CHLORIDE  --   --  110*  --    CO2  --   --  24  --    BUN  --   --  10  --    CR  --   --  0.58  --    ANIONGAP  --   --  5  --    VANESSA  --   --  8.9  --    GLC  --  92 88 91     No results found for this or any previous visit (from the past 24 hour(s)).  Medications     lactated ringers 75 mL/hr at 01/12/22 1715       acetaminophen  975 mg Oral Q8H     allopurinol  300 mg Oral QPM     dorzolamide  1 drop Both Eyes BID     enoxaparin ANTICOAGULANT  40 mg Subcutaneous Q24H     latanoprost  1 drop Both Eyes At Bedtime     letrozole  2.5 mg Oral or Feeding  Tube QPM     sodium chloride (PF)  3 mL Intracatheter Q8H

## 2022-01-14 NOTE — PLAN OF CARE
A&O x 4, up with SBA and walker. AVSS on room air. Abdominal incisions CDI, abdominal binder in place. Tylenol and oxycodone for pain. Voiding. Passing gas, no BM yet. Nausea x 1 relieved with zofran.

## 2022-01-15 VITALS
DIASTOLIC BLOOD PRESSURE: 86 MMHG | RESPIRATION RATE: 16 BRPM | WEIGHT: 138 LBS | TEMPERATURE: 97.8 F | OXYGEN SATURATION: 95 % | SYSTOLIC BLOOD PRESSURE: 148 MMHG | HEART RATE: 86 BPM | HEIGHT: 57 IN | BODY MASS INDEX: 29.77 KG/M2

## 2022-01-15 LAB
GLUCOSE BLDC GLUCOMTR-MCNC: 85 MG/DL (ref 70–99)
GLUCOSE BLDC GLUCOMTR-MCNC: 92 MG/DL (ref 70–99)

## 2022-01-15 PROCEDURE — 250N000011 HC RX IP 250 OP 636: Performed by: PHYSICIAN ASSISTANT

## 2022-01-15 PROCEDURE — 250N000013 HC RX MED GY IP 250 OP 250 PS 637: Performed by: COLON & RECTAL SURGERY

## 2022-01-15 RX ADMIN — ACETAMINOPHEN 975 MG: 325 TABLET, FILM COATED ORAL at 11:25

## 2022-01-15 RX ADMIN — ACETAMINOPHEN 975 MG: 325 TABLET, FILM COATED ORAL at 05:08

## 2022-01-15 RX ADMIN — DORZOLAMIDE HCL 1 DROP: 20 SOLUTION/ DROPS OPHTHALMIC at 08:35

## 2022-01-15 RX ADMIN — ENOXAPARIN SODIUM 40 MG: 40 INJECTION SUBCUTANEOUS at 08:35

## 2022-01-15 ASSESSMENT — ACTIVITIES OF DAILY LIVING (ADL)
ADLS_ACUITY_SCORE: 6

## 2022-01-15 NOTE — PLAN OF CARE
Patient alert & oriented x4. Vitals stable except for SBP that was slightly elevated at 140/78 at 0102. Patient given scheduled tylenol for pain. No PRN pain meds given this shift. No drainage noted to abdominal incisions. Bowel sounds active and patient is passing gas. Possible discharge today. Will continue to monitor.

## 2022-01-15 NOTE — PLAN OF CARE
VSS ex HTN, A/O. Ind. Abdo incisions CDI. Minimal pain. Tylenol given.tolerating diet. Had few loose BM. Possible d'c home tomorrow.

## 2022-01-15 NOTE — PROGRESS NOTES
VSS. A/O. Ind. abdo incisions CDI. Tolerating diet. +bm, voiding. Denies pain. d'c papers and meds given. lovenox pt teaching given.

## 2022-01-15 NOTE — PLAN OF CARE
A/Ox4, VSS on RA, independent in room. Abdominal incision liquid bandage DAHLIA, pain managed with scheduled meds, denies N/V. Possible discharge tomorrow.

## 2022-01-15 NOTE — PROGRESS NOTES
Colon and Rectal Surgery Progress Note          Assessment and Plan:     POD #3 lap right colectomy    Doing well.  Plan for discharge today      Steve Cornejo MD FACS FASCRS  Colorectal Surgeon       [facility]  [address], [address]  [place] [address]  T: [phone]  F: [phone]  Pager: [phone]  [email or web address]                 Interval History:     Doing well.  Want to go home.               Physical Exam:   Vitals were reviewed  Patient Vitals for the past 24 hrs:   BP Temp Temp src Pulse Resp SpO2   01/15/22 0729 (!) 148/86 97.8  F (36.6  C) Oral 86 16 95 %   01/15/22 0508 136/76 97.4  F (36.3  C) Oral 84 16 96 %   01/15/22 0102 (!) 140/78 97.5  F (36.4  C) Oral 82 16 93 %   01/14/22 1942 117/73 98.7  F (37.1  C) Oral 85 16 95 %   01/14/22 1555 (!) 163/84 99.7  F (37.6  C) Oral 97 16 95 %   01/14/22 1319 -- -- -- -- 16 --   01/14/22 1114 (!) 156/85 99.9  F (37.7  C) Oral 94 18 94 %         Intake/Output Summary (Last 24 hours) at 1/15/2022 1016  Last data filed at 1/15/2022 0800  Gross per 24 hour   Intake 1020 ml   Output 600 ml   Net 420 ml       Head - Nomocephalic atraumatic  Sclera anicteric  Extremities warm and well perfused  Lungs - breathing non labored,   Abdomen - soft, flat, minimally tender, wound looks good  Rectal exam - deferred  Skin - no rash  Psych - affect appropriate  Neuro - no focal deficits             Data:   All laboratory and imaging data in the past 24 hours reviewed    CBC  Lab Results   Component Value Date    WBC 7.4 01/13/2022    HGB 9.6 (L) 01/14/2022    HGB 10.3 (L) 01/13/2022    HCT 31.9 (L) 01/13/2022     (L) 01/13/2022       BMP  Recent Labs   Lab Test 01/15/22  0507 01/15/22  0201 01/14/22  0615 01/13/22  0754   NA  --   --   --  139   POTASSIUM  --   --   --  3.8   CHLORIDE  --   --   --  110*   CO2  --   --   --  24   ANIONGAP  --   --   --  5   GLC 92 85   < > 88   BUN  --   --   --  10   CR  --   --   --  0.58   VANESSA  --    --   --  8.9    < > = values in this interval not displayed.       Liver Function Studies - No results for input(s): PROTTOTAL, ALBUMIN, BILITOTAL, ALKPHOS, AST, ALT, BILIDIRECT in the last 49467 hours.                 Medications:     Current Facility-Administered Medications Ordered in Epic   Medication Dose Route Frequency Last Rate Last Admin     acetaminophen (TYLENOL) tablet 650 mg  650 mg Oral or Feeding Tube Q4H PRN         acetaminophen (TYLENOL) tablet 975 mg  975 mg Oral Q8H   975 mg at 01/15/22 0508     albuterol (PROVENTIL HFA/VENTOLIN HFA) inhaler  1-2 puff Inhalation Q6H PRN         allopurinol (ZYLOPRIM) tablet 300 mg  300 mg Oral QPM   300 mg at 01/14/22 2109     benzocaine-menthol (CHLORASEPTIC) 6-10 MG lozenge 1 lozenge  1 lozenge Buccal Q1H PRN         diphenhydrAMINE (BENADRYL) injection 12.5 mg  12.5 mg Intravenous Q6H PRN         dorzolamide (TRUSOPT) 2 % ophthalmic solution 1 drop  1 drop Both Eyes BID   1 drop at 01/15/22 0835     enoxaparin ANTICOAGULANT (LOVENOX) injection 40 mg  40 mg Subcutaneous Q24H   40 mg at 01/15/22 0835     HYDROmorphone (DILAUDID) injection 0.2 mg  0.2 mg Intravenous Q2H PRN        Or     HYDROmorphone (DILAUDID) injection 0.4 mg  0.4 mg Intravenous Q2H PRN   0.4 mg at 01/12/22 2023     lactated ringers infusion   Intravenous Continuous   Stopped at 01/14/22 1606     latanoprost (XALATAN) 0.005 % ophthalmic solution 1 drop  1 drop Both Eyes At Bedtime   1 drop at 01/14/22 2110     letrozole (FEMARA) tablet 2.5 mg  2.5 mg Oral or Feeding Tube QPM   2.5 mg at 01/14/22 2227     lidocaine (LMX4) cream   Topical Q1H PRN         lidocaine 1 % 0.1-1 mL  0.1-1 mL Other Q1H PRN         naloxone (NARCAN) injection 0.2 mg  0.2 mg Intravenous Q2 Min PRN        Or     naloxone (NARCAN) injection 0.4 mg  0.4 mg Intravenous Q2 Min PRN        Or     naloxone (NARCAN) injection 0.2 mg  0.2 mg Intramuscular Q2 Min PRN        Or     naloxone (NARCAN) injection 0.4 mg  0.4 mg  Intramuscular Q2 Min PRN         ondansetron (ZOFRAN-ODT) ODT tab 4 mg  4 mg Oral Q6H PRN   4 mg at 01/14/22 0528    Or     ondansetron (ZOFRAN) injection 4 mg  4 mg Intravenous Q6H PRN         oxyCODONE (ROXICODONE) tablet 5 mg  5 mg Oral or Feeding Tube Q4H PRN   5 mg at 01/13/22 2157    Or     oxyCODONE (ROXICODONE) tablet 10 mg  10 mg Oral or Feeding Tube Q4H PRN         prochlorperazine (COMPAZINE) injection 5 mg  5 mg Intravenous Q6H PRN         sodium chloride (PF) 0.9% PF flush 3 mL  3 mL Intracatheter Q8H   3 mL at 01/15/22 0835     sodium chloride (PF) 0.9% PF flush 3 mL  3 mL Intracatheter q1 min prn         Current Outpatient Medications Ordered in Epic   Medication     enoxaparin ANTICOAGULANT (LOVENOX) 40 MG/0.4ML syringe     oxyCODONE (ROXICODONE) 5 MG tablet

## 2022-01-17 NOTE — DISCHARGE SUMMARY
[facility] Discharge Summary      Armaan Bucio MRN# [id number]   Age: 76 year old YOB: 1945     Date of Admission:  1/12/2022  Date of Discharge::  1/15/2022 12:03 PM  Admitting Physician:  Shruthi Rico MD  Discharge Physician:  Shruthi Rico MD     PCP:  Mervin Gaming    Disposition: Patient discharged from [facility] to home in stable condition.        Primary Diagnosis:   Unresectable colon polyp          Discharge Medications:     Discharge Medication List as of 1/15/2022 11:31 AM      START taking these medications    Details   enoxaparin ANTICOAGULANT (LOVENOX) 40 MG/0.4ML syringe Inject 0.4 mLs (40 mg) Subcutaneous every 24 hours for 2 days, Disp-0.8 mL, R-0, E-Prescribe      oxyCODONE (ROXICODONE) 5 MG tablet 1 tablet (5 mg) by Oral or Feeding Tube route every 6 hours as needed for moderate to severe pain, Disp-20 tablet, R-0, E-Prescribe         CONTINUE these medications which have NOT CHANGED    Details   acetaminophen (TYLENOL) 500 MG tablet Take 500-1,000 mg by mouth every 6 hours as needed for mild pain, Historical      albuterol (PROAIR HFA/PROVENTIL HFA/VENTOLIN HFA) 108 (90 Base) MCG/ACT inhaler Inhale 1-2 puffs into the lungs every 6 hours as needed for shortness of breath / dyspnea , Historical      allopurinol (ZYLOPRIM) 300 MG tablet Take 300 mg by mouth every evening , Historical      aspirin (ASA) 81 MG chewable tablet Take 81 mg by mouth daily, Historical      dorzolamide (TRUSOPT) 2 % ophthalmic solution Place 1 drop into both eyes 2 times daily , Historical      latanoprost (XALATAN) 0.005 % ophthalmic solution Place 1 drop into both eyes At Bedtime , Historical      letrozole (FEMARA) 2.5 MG tablet Take 2.5 mg by mouth every evening, Historical      ondansetron (ZOFRAN-ODT) 4 MG ODT tab Take 4 mg by mouth every 6 hours as needed for nausea or vomiting , Historical         STOP taking these medications       metroNIDAZOLE (FLAGYL)  500 MG tablet Comments:   Reason for Stopping:         neomycin (MYCIFRADIN) 500 MG tablet Comments:   Reason for Stopping:                      Follow Up, Special Instructions:     Discharge diet: Low residue   Discharge activity: Lifting restricted to 15 pounds   Discharge follow-up: Follow up with Dr. Rico in 3-4 weeks   Wound care: May get incision wet in shower but do not soak or scrub              Procedures:     Procedure(s): Laparoscopic right colectomy       No other procedures performed during this admission            Consultations:   hospitalist          Brief Hospital Summary:     Patient is a 76 year old female who underwent laparoscopic right colectomy on 1/12/21 by Dr. Rico.   There were no immediate complications during this procedure.    Please refer to the full operative summary for details.  The patient's hospital course was unremarkable.  Pain was controlled on oral pain regimen.  She was tolerating a low fiber diet.  Bowel function had returned prior to discharge.  She recovered as anticipated and experienced no post-operative complications.         Attestation:  I have reviewed today's vital signs, notes, medications, labs and imaging.    Coni Rodriguez ([place]), PA-C  Colorectal Physician Assistant    [facility]  [address] [address]  T: [phone]  F: [phone]            ADDENDUM:  Length of stay: 3 days  Indicate Y or N for the following:  UTI  No  C diff  No  PNA  No  SSI No  DVT No  PE  No  CVA No  MI No  Enterocutaneous fistula  No  Peripheral nerve injury  No  Abscess (not adjacent to anastomosis)  No  Leak No    Treated with:   Antibiotics NO   Drain  NO   Reoperation  NO  Death within 30 days No  Reintubation  No  Reoperation  No   Procedure n/a

## (undated) DEVICE — SU PDS II 0 CTX 60" Z990G

## (undated) DEVICE — ENDO TROCAR SLEEVE KII Z-THREADED 05X100MM CTS02

## (undated) DEVICE — SU VICRYL 3-0 SH CR 8X18" J774

## (undated) DEVICE — EVAC SYSTEM CLEAR FLOW SC082500

## (undated) DEVICE — STPL LINEAR CUT 75MM TLC75

## (undated) DEVICE — BAG CLEAR TRASH 1.3M 39X33" P4040C

## (undated) DEVICE — DRAPE LAP W/ARMBOARD 29410

## (undated) DEVICE — ENDO TROCAR FIRST ENTRY KII FIOS Z-THRD 05X100MM CTF03

## (undated) DEVICE — DRAPE IOBAN INCISE 23X17" 6650EZ

## (undated) DEVICE — JELLY LUBRICATING SURGILUBE 4OZ TUBE

## (undated) DEVICE — Device

## (undated) DEVICE — SU VICRYL 0 UR-6 27" J603H

## (undated) DEVICE — GLOVE PROTEXIS BLUE W/NEU-THERA 6.5  2D73EB65

## (undated) DEVICE — SUCTION CANISTER MEDIVAC LINER 3000ML W/LID 65651-530

## (undated) DEVICE — LINEN TOWEL PACK X5 5464

## (undated) DEVICE — PREP CHLORAPREP 26ML TINTED ORANGE  260815

## (undated) DEVICE — STPL RELOAD LINEAR CUT 75MM TCR75

## (undated) DEVICE — GOWN LG DISP 9515

## (undated) DEVICE — GLOVE PROTEXIS W/NEU-THERA 7.0  2D73TE70

## (undated) DEVICE — PACK MAJOR SBA15MAFSI

## (undated) DEVICE — LINEN HALF SHEET 5512

## (undated) DEVICE — ESU GROUND PAD UNIVERSAL W/O CORD

## (undated) DEVICE — LINEN FULL SHEET 5511

## (undated) DEVICE — SUCTION CANISTER STRAW 65652-008

## (undated) DEVICE — ESU LIGASURE LAPAROSCOPIC BLUNT TIP SEALER 5MMX37CM LF1837

## (undated) DEVICE — ENDO TROCAR BLUNT TIP KII BALLOON 12X100MM C0R47

## (undated) DEVICE — ESU CORD MONOPOLAR 10'  E0510

## (undated) DEVICE — BASIN SET MINOR DISP

## (undated) DEVICE — SU MONOCRYL 4-0 PS-2 18" UND Y496G

## (undated) DEVICE — BAG RED BIOHAZARD

## (undated) DEVICE — ESU HOLDER LAP INST DISP PURPLE LONG 330MM H-PRO-330

## (undated) DEVICE — GLOVE PROTEXIS MICRO 6.5  2D73PM65

## (undated) DEVICE — STPL LINEAR 90 X 3.5MM TA9035S

## (undated) DEVICE — SUCTION TIP POOLE K770

## (undated) DEVICE — SOL NACL 0.9% IRRIG 1000ML BOTTLE 2F7124

## (undated) DEVICE — PREP POVIDONE IODINE SOLUTION 10% 120ML

## (undated) DEVICE — PAD CHUX UNDERPAD 30X30"

## (undated) DEVICE — KIT PATIENT POSITIONING PIGAZZI LATEX FREE 40580

## (undated) DEVICE — SYSTEM CLEARIFY VISUALIZATION 21-345

## (undated) DEVICE — SOL WATER IRRIG 1000ML BOTTLE 2F7114

## (undated) RX ORDER — NEOSTIGMINE METHYLSULFATE 1 MG/ML
VIAL (ML) INJECTION
Status: DISPENSED
Start: 2022-01-12

## (undated) RX ORDER — BUPIVACAINE HYDROCHLORIDE 5 MG/ML
INJECTION, SOLUTION EPIDURAL; INTRACAUDAL
Status: DISPENSED
Start: 2022-01-12

## (undated) RX ORDER — PROPOFOL 10 MG/ML
INJECTION, EMULSION INTRAVENOUS
Status: DISPENSED
Start: 2022-01-12

## (undated) RX ORDER — ACETAMINOPHEN 325 MG/1
TABLET ORAL
Status: DISPENSED
Start: 2022-01-12

## (undated) RX ORDER — HYDROMORPHONE HYDROCHLORIDE 1 MG/ML
INJECTION, SOLUTION INTRAMUSCULAR; INTRAVENOUS; SUBCUTANEOUS
Status: DISPENSED
Start: 2022-01-12

## (undated) RX ORDER — HYDROMORPHONE HCL IN WATER/PF 6 MG/30 ML
PATIENT CONTROLLED ANALGESIA SYRINGE INTRAVENOUS
Status: DISPENSED
Start: 2022-01-12

## (undated) RX ORDER — DEXAMETHASONE SODIUM PHOSPHATE 4 MG/ML
INJECTION, SOLUTION INTRA-ARTICULAR; INTRALESIONAL; INTRAMUSCULAR; INTRAVENOUS; SOFT TISSUE
Status: DISPENSED
Start: 2022-01-12

## (undated) RX ORDER — ALBUMIN, HUMAN INJ 5% 5 %
SOLUTION INTRAVENOUS
Status: DISPENSED
Start: 2022-01-12

## (undated) RX ORDER — HEPARIN SODIUM 5000 [USP'U]/.5ML
INJECTION, SOLUTION INTRAVENOUS; SUBCUTANEOUS
Status: DISPENSED
Start: 2022-01-12

## (undated) RX ORDER — ONDANSETRON 2 MG/ML
INJECTION INTRAMUSCULAR; INTRAVENOUS
Status: DISPENSED
Start: 2022-01-12

## (undated) RX ORDER — CEFAZOLIN SODIUM 2 G/100ML
INJECTION, SOLUTION INTRAVENOUS
Status: DISPENSED
Start: 2022-01-12

## (undated) RX ORDER — LIDOCAINE HYDROCHLORIDE 20 MG/ML
INJECTION, SOLUTION EPIDURAL; INFILTRATION; INTRACAUDAL; PERINEURAL
Status: DISPENSED
Start: 2022-01-12

## (undated) RX ORDER — GLYCOPYRROLATE 0.2 MG/ML
INJECTION, SOLUTION INTRAMUSCULAR; INTRAVENOUS
Status: DISPENSED
Start: 2022-01-12

## (undated) RX ORDER — FENTANYL CITRATE 50 UG/ML
INJECTION, SOLUTION INTRAMUSCULAR; INTRAVENOUS
Status: DISPENSED
Start: 2022-01-12